# Patient Record
Sex: MALE | Race: WHITE | Employment: UNEMPLOYED | URBAN - METROPOLITAN AREA
[De-identification: names, ages, dates, MRNs, and addresses within clinical notes are randomized per-mention and may not be internally consistent; named-entity substitution may affect disease eponyms.]

---

## 2023-11-23 ENCOUNTER — HOSPITAL ENCOUNTER (EMERGENCY)
Age: 33
Discharge: ANOTHER HOSPITAL | End: 2023-11-24
Attending: STUDENT IN AN ORGANIZED HEALTH CARE EDUCATION/TRAINING PROGRAM

## 2023-11-23 DIAGNOSIS — F29 PSYCHOSIS, UNSPECIFIED PSYCHOSIS TYPE (HCC): Primary | ICD-10-CM

## 2023-11-23 LAB
ALBUMIN SERPL-MCNC: 4.9 G/DL (ref 3.4–5)
ALBUMIN/GLOB SERPL: 1.4 {RATIO} (ref 1.1–2.2)
ALP SERPL-CCNC: 90 U/L (ref 40–129)
ALT SERPL-CCNC: 18 U/L (ref 10–40)
AMPHETAMINES UR QL SCN>1000 NG/ML: ABNORMAL
ANION GAP SERPL CALCULATED.3IONS-SCNC: 18 MMOL/L (ref 3–16)
APAP SERPL-MCNC: <5 UG/ML (ref 10–30)
AST SERPL-CCNC: 17 U/L (ref 15–37)
BARBITURATES UR QL SCN>200 NG/ML: ABNORMAL
BASOPHILS # BLD: 0.1 K/UL (ref 0–0.2)
BASOPHILS NFR BLD: 0.5 %
BENZODIAZ UR QL SCN>200 NG/ML: ABNORMAL
BETA-HYDROXYBUTYRATE: 1.2 MMOL/L (ref 0–0.27)
BILIRUB SERPL-MCNC: 0.6 MG/DL (ref 0–1)
BUN SERPL-MCNC: 12 MG/DL (ref 7–20)
CALCIUM SERPL-MCNC: 9.2 MG/DL (ref 8.3–10.6)
CANNABINOIDS UR QL SCN>50 NG/ML: POSITIVE
CHLORIDE SERPL-SCNC: 104 MMOL/L (ref 99–110)
CO2 SERPL-SCNC: 19 MMOL/L (ref 21–32)
COCAINE UR QL SCN: ABNORMAL
CREAT SERPL-MCNC: 0.9 MG/DL (ref 0.9–1.3)
DEPRECATED RDW RBC AUTO: 13.5 % (ref 12.4–15.4)
DRUG SCREEN COMMENT UR-IMP: ABNORMAL
EOSINOPHIL # BLD: 0 K/UL (ref 0–0.6)
EOSINOPHIL NFR BLD: 0.1 %
ETHANOLAMINE SERPL-MCNC: NORMAL MG/DL (ref 0–0.08)
FENTANYL SCREEN, URINE: ABNORMAL
GFR SERPLBLD CREATININE-BSD FMLA CKD-EPI: >60 ML/MIN/{1.73_M2}
GLUCOSE SERPL-MCNC: 121 MG/DL (ref 70–99)
HCT VFR BLD AUTO: 44.1 % (ref 40.5–52.5)
HGB BLD-MCNC: 14.8 G/DL (ref 13.5–17.5)
LIPASE SERPL-CCNC: 156 U/L (ref 13–60)
LYMPHOCYTES # BLD: 1.5 K/UL (ref 1–5.1)
LYMPHOCYTES NFR BLD: 10.1 %
MCH RBC QN AUTO: 27.6 PG (ref 26–34)
MCHC RBC AUTO-ENTMCNC: 33.7 G/DL (ref 31–36)
MCV RBC AUTO: 82.1 FL (ref 80–100)
METHADONE UR QL SCN>300 NG/ML: ABNORMAL
MONOCYTES # BLD: 0.8 K/UL (ref 0–1.3)
MONOCYTES NFR BLD: 5.3 %
NEUTROPHILS # BLD: 12.4 K/UL (ref 1.7–7.7)
NEUTROPHILS NFR BLD: 84 %
OPIATES UR QL SCN>300 NG/ML: ABNORMAL
OXYCODONE UR QL SCN: ABNORMAL
PCP UR QL SCN>25 NG/ML: ABNORMAL
PH UR STRIP: 5 [PH]
PLATELET # BLD AUTO: 406 K/UL (ref 135–450)
PMV BLD AUTO: 8 FL (ref 5–10.5)
POTASSIUM SERPL-SCNC: 3.5 MMOL/L (ref 3.5–5.1)
PROT SERPL-MCNC: 8.4 G/DL (ref 6.4–8.2)
RBC # BLD AUTO: 5.37 M/UL (ref 4.2–5.9)
SALICYLATES SERPL-MCNC: 0.6 MG/DL (ref 15–30)
SODIUM SERPL-SCNC: 141 MMOL/L (ref 136–145)
WBC # BLD AUTO: 14.7 K/UL (ref 4–11)

## 2023-11-23 PROCEDURE — 2580000003 HC RX 258: Performed by: PHYSICIAN ASSISTANT

## 2023-11-23 PROCEDURE — 80143 DRUG ASSAY ACETAMINOPHEN: CPT

## 2023-11-23 PROCEDURE — 80179 DRUG ASSAY SALICYLATE: CPT

## 2023-11-23 PROCEDURE — 6360000002 HC RX W HCPCS: Performed by: STUDENT IN AN ORGANIZED HEALTH CARE EDUCATION/TRAINING PROGRAM

## 2023-11-23 PROCEDURE — 6360000002 HC RX W HCPCS: Performed by: PHYSICIAN ASSISTANT

## 2023-11-23 PROCEDURE — 96374 THER/PROPH/DIAG INJ IV PUSH: CPT

## 2023-11-23 PROCEDURE — 82077 ASSAY SPEC XCP UR&BREATH IA: CPT

## 2023-11-23 PROCEDURE — 96372 THER/PROPH/DIAG INJ SC/IM: CPT

## 2023-11-23 PROCEDURE — 80307 DRUG TEST PRSMV CHEM ANLYZR: CPT

## 2023-11-23 PROCEDURE — 83690 ASSAY OF LIPASE: CPT

## 2023-11-23 PROCEDURE — 85025 COMPLETE CBC W/AUTO DIFF WBC: CPT

## 2023-11-23 PROCEDURE — 82010 KETONE BODYS QUAN: CPT

## 2023-11-23 PROCEDURE — 80053 COMPREHEN METABOLIC PANEL: CPT

## 2023-11-23 PROCEDURE — 96376 TX/PRO/DX INJ SAME DRUG ADON: CPT

## 2023-11-23 PROCEDURE — 99285 EMERGENCY DEPT VISIT HI MDM: CPT

## 2023-11-23 PROCEDURE — 96375 TX/PRO/DX INJ NEW DRUG ADDON: CPT

## 2023-11-23 RX ORDER — HALOPERIDOL 5 MG/ML
5 INJECTION INTRAMUSCULAR ONCE
Status: COMPLETED | OUTPATIENT
Start: 2023-11-23 | End: 2023-11-23

## 2023-11-23 RX ORDER — 0.9 % SODIUM CHLORIDE 0.9 %
1000 INTRAVENOUS SOLUTION INTRAVENOUS ONCE
Status: COMPLETED | OUTPATIENT
Start: 2023-11-23 | End: 2023-11-23

## 2023-11-23 RX ORDER — LORAZEPAM 2 MG/ML
2 INJECTION INTRAMUSCULAR ONCE
Status: COMPLETED | OUTPATIENT
Start: 2023-11-23 | End: 2023-11-23

## 2023-11-23 RX ORDER — LIDOCAINE HYDROCHLORIDE 40 MG/ML
SOLUTION TOPICAL
Status: DISPENSED
Start: 2023-11-23 | End: 2023-11-23

## 2023-11-23 RX ORDER — DIPHENHYDRAMINE HYDROCHLORIDE 50 MG/ML
50 INJECTION INTRAMUSCULAR; INTRAVENOUS ONCE
Status: COMPLETED | OUTPATIENT
Start: 2023-11-23 | End: 2023-11-23

## 2023-11-23 RX ORDER — LORAZEPAM 1 MG/1
1 TABLET ORAL ONCE
Status: DISCONTINUED | OUTPATIENT
Start: 2023-11-23 | End: 2023-11-24 | Stop reason: HOSPADM

## 2023-11-23 RX ORDER — HYDROXYZINE PAMOATE 25 MG/1
50 CAPSULE ORAL 3 TIMES DAILY PRN
Status: DISCONTINUED | OUTPATIENT
Start: 2023-11-23 | End: 2023-11-24 | Stop reason: HOSPADM

## 2023-11-23 RX ORDER — LORAZEPAM 2 MG/ML
1 INJECTION INTRAMUSCULAR ONCE
Status: COMPLETED | OUTPATIENT
Start: 2023-11-23 | End: 2023-11-23

## 2023-11-23 RX ADMIN — LORAZEPAM 1 MG: 2 INJECTION INTRAMUSCULAR; INTRAVENOUS at 18:17

## 2023-11-23 RX ADMIN — SODIUM CHLORIDE 1000 ML: 9 INJECTION, SOLUTION INTRAVENOUS at 16:22

## 2023-11-23 RX ADMIN — SODIUM CHLORIDE 1000 ML: 9 INJECTION, SOLUTION INTRAVENOUS at 14:00

## 2023-11-23 RX ADMIN — HALOPERIDOL LACTATE 5 MG: 5 INJECTION, SOLUTION INTRAMUSCULAR at 13:54

## 2023-11-23 RX ADMIN — LORAZEPAM 2 MG: 2 INJECTION INTRAMUSCULAR; INTRAVENOUS at 13:55

## 2023-11-23 RX ADMIN — DIPHENHYDRAMINE HYDROCHLORIDE 50 MG: 50 INJECTION INTRAMUSCULAR; INTRAVENOUS at 13:54

## 2023-11-23 ASSESSMENT — SLEEP AND FATIGUE QUESTIONNAIRES
AVERAGE NUMBER OF SLEEP HOURS: 7
DO YOU HAVE DIFFICULTY SLEEPING: YES
DO YOU USE A SLEEP AID: YES
AVERAGE NUMBER OF SLEEP HOURS: 7
DO YOU HAVE DIFFICULTY SLEEPING: YES
DO YOU USE A SLEEP AID: COMMENT
SLEEP PATTERN: DIFFICULTY FALLING ASLEEP
SLEEP PATTERN: DIFFICULTY FALLING ASLEEP;RESTLESSNESS

## 2023-11-23 ASSESSMENT — PAIN - FUNCTIONAL ASSESSMENT: PAIN_FUNCTIONAL_ASSESSMENT: NONE - DENIES PAIN

## 2023-11-23 ASSESSMENT — ENCOUNTER SYMPTOMS
COUGH: 0
CHEST TIGHTNESS: 0
ABDOMINAL PAIN: 0
BACK PAIN: 1
SHORTNESS OF BREATH: 0
DIARRHEA: 0
NAUSEA: 0
VOMITING: 0

## 2023-11-23 NOTE — ED PROVIDER NOTES
In addition to the advanced practice provider, I personally saw Patito Peralta and performed a substantive portion of the visit including all aspects of the medical decision making. Medical Decision Making    Patient called police today because he was very anxious, did not know where his mother was. He tried to reach her by phone and could not so he called the police. He states that he has not slept in 3 days. He endorses racing thoughts. Pt was admitted to inpatient psych for psychosis at [address] last year  Is not taking prescribed medications from this admission  Does not shower. Does not drive. Does not work. He says that he is not suicidal or homicidal.    On exam patient is resting comfortably. He denies SI or HI. His judgment is very poor. He does appear internally stimulated. He is very disheveled and unkempt. There are cockroaches noted on his clothing. SEP-1  Is this patient to be included in the SEP-1 Core Measure due to severe sepsis or septic shock? No   Exclusion criteria - the patient is NOT to be included for SEP-1 Core Measure due to:   Infection is not suspected    Screenings     [place] Coma Scale  Eye Opening: Spontaneous  Best Verbal Response: Oriented  Best Motor Response: Obeys commands  Hubert Coma Scale Score: 15           No orders to display     Labs Reviewed   CBC WITH AUTO DIFFERENTIAL - Abnormal; Notable for the following components:       Result Value    WBC 14.7 (*)     Neutrophils Absolute 12.4 (*)     All other components within normal limits   COMPREHENSIVE METABOLIC PANEL - Abnormal; Notable for the following components:    CO2 19 (*)     Anion Gap 18 (*)     Glucose 121 (*)     Total Protein 8.4 (*)     All other components within normal limits   LIPASE - Abnormal; Notable for the following components:    Lipase 156.0 (*)     All other components within normal limits   ACETAMINOPHEN LEVEL - Abnormal; Notable for the following components:    Acetaminophen Level

## 2023-11-23 NOTE — VIRTUAL HEALTH
[facility] Crisis Assessment       Chief Complaint: \"I can't sleep\"    Diagnosis-Unspecified: Unspecified Anxiety Disorder    Voluntary/Involuntary Status: Voluntary    Guardian/POA: none    C-SSRS Risk (High, Moderate, Low): Low    Psychosis (if present): \"My game and the tv show were interacting. \"    MH & Substance Use Treatment: None reported. Substance Use: \"I use marijuana and alcohol to help me sleep sometimes. \"  I don't want to be dependent on alcohol. Trauma/Abuse: None reported    Violence: \"I would only hurt someone to defend myself. \"  \"I am a non violent person. \"    Legal: No criminal history    Access to Weapons: \"I see no reason to have weapons. I am a non violent person. \"    Risk Factors: I have not slept for 3 days. My mom went to [place] to visit family and I was worried about her. Mr. Ebonie Gutiérrez uses alcohol and marijuana to reduce anxiety and to sleep. Protective Factors: Mr. Ebonie Gutiérrez has a cell phone to be able to call his mother. His mother is supportive of him. Mr. Ebonie Gutiérrez wants to go home to take care of his dog. Support system: Mom, Anival Molina    Living Situation: I live alone with my dog. Education:    Employment: \"I am a professional . \"    Brief Summary: Mr. Ebonie Gutiérrez arrived at the ED by law enforcement. Mr. Ebonie Gutiérrez presents as agitated, unkept, and guarded. Mr. Ebonie Gutiérrez expresses that he was very anxious over his mother going to [place] to visit with family. Mr. Ebonie Gutiérrez said that he lives alone with his dog. Mr. Ebonie Gutiérrez reports that he wants to sleep but has not slept in 3 days. Mr. Ebonie Gutiérrez said that he had tried to call his mother 12 times and she would not answer the phone so he called the police due to concern for his mother. While in route to the hospital, he was able to speak to his mother and felt better knowing that she is ok. Mr. Ebonie Gutiérrez denied any suicidal thoughts and said that he has not had any SI for over a year.   Mr. Ebonie Gutiérrez denied any feelings to

## 2023-11-23 NOTE — ED NOTES
Pt yelling at staff, told this rn to \"get the hell away\". Pt screamed, and then proceeded to yell that he wants to go home.       Mitchel Valderrama RN  11/23/23 8385

## 2023-11-23 NOTE — ED NOTES
A safety risk assessment of the patient environment was conducted and the room was modified for safety. The room is free of all removed equipment, medical supplies, and articles that may pose a threat to the patient or others such as sharp items and needle boxes. Items were removed from unlocked drawers, cabinets are locked when locks are available, extra linens, medical cords, cables, pictures from wall, ect. Are all removed. The patient call light was left in place due to the medical nature of the unit ad the needs of the patient. The patient was place in a room close to the nursing desk. The patient was placed in a hospital gown. A search of the patient and patient environment was performed. All personal items including sharp objects, belts, ties, and ingestibles were removed and secured. Patient  at bedside. Bed locked and in lowest position with both side rails raised. Call light within reach. Will monitor pt. hourly.         Lashon Zhou RN  11/23/23 9087

## 2023-11-23 NOTE — ED NOTES
Pt refusing to wear monitoring equipment and refusing to give a urine sample at this time.       Itzel Echevarria RN  11/23/23 2849

## 2023-11-23 NOTE — ED NOTES
Pt agitated in bed refusing to give urine sample and still refusing to wear monitor equipment despite education     Dar Mendoza RN  11/23/23 9297

## 2023-11-23 NOTE — ED NOTES
Pt in bed resting with eyes closed sitter in place pt remains safe at this time     Farhat Pereira RN  11/23/23 0871

## 2023-11-23 NOTE — ED NOTES
Detail Level: Generalized Pt concerned for his dog, this RN reached out to [facility] for assistance. Per [facility] [place] dispatch dog has plenty of food and water and mother will take care of dog when she gets back in town tomorrow.       Matthew Paulino RN  11/23/23 3288 Include Location In Plan?: No Detail Level: Simple

## 2023-11-23 NOTE — ED NOTES
Security came and picked up patient belongings.  Shirt, pants, shoes, socks keys, phone     Ulysses Smock, RN  11/23/23 9287

## 2023-11-23 NOTE — ED PROVIDER NOTES
[facility]        Pt Name: Simon Sanchez  MRN: [id number]  [address] 1990  Date of evaluation: 11/23/2023  Provider: Enmanuel Clarke PA-C  PCP: Deborah Gilmore MD  Note Started: 10:31 AM EST 11/23/23       I have seen and evaluated this patient with my supervising physician Ant Nickersonyers, [address] [place] [address] [address]       Chief Complaint   Patient presents with    Insomnia     Patient states, I am bipolar and I have insomnia. Have not slept in 3 days. \" Lost track of days and days are blending together for years. Denies suicidal thoughts and states he has homicidal thoughts but it would only be in self defense. Feels he needs a therapist to explain what's going on in his head. Patient is disheveled and appears to not be taking care of himself such as bathing. HISTORY OF PRESENT ILLNESS: 1 or more Elements     History from : Patient    Limitations to history : None    Simon Sanchez is a 29 y.o. male with a history of bipolar, anxiety and insomnia and marijuana abuse who presents to the emergency department today stating he is experiencing insomnia and has not slept in 3 days. Patient appears very unkempt. It does not appear he has showered in quite some time. His shoes are covered in dog feces and he has an overall very foul odor. Patient states he lives in a trailer with just his dog. His mother pays for the trailer and all of his food. He does not work. He states he just plays video games all day. He smokes marijuana to help cope with his chronic back pain. He states that he was admitted to [address] [place] [address] behavioral health inpatient psychiatry last year in November for a few days. He was discharged with medications but states the medications did not help and he quit taking them. He denies having any suicidal ideation. When I asked him about homicidal ideation he states \"in video games\".   He denies headache, lightheadedness or

## 2023-11-24 VITALS
WEIGHT: 190 LBS | OXYGEN SATURATION: 100 % | RESPIRATION RATE: 16 BRPM | HEIGHT: 68 IN | SYSTOLIC BLOOD PRESSURE: 131 MMHG | HEART RATE: 85 BPM | BODY MASS INDEX: 28.79 KG/M2 | DIASTOLIC BLOOD PRESSURE: 72 MMHG | TEMPERATURE: 98 F

## 2023-11-24 PROCEDURE — 6360000002 HC RX W HCPCS: Performed by: EMERGENCY MEDICINE

## 2023-11-24 RX ORDER — HALOPERIDOL 5 MG/ML
5 INJECTION INTRAMUSCULAR ONCE
Status: DISCONTINUED | OUTPATIENT
Start: 2023-11-24 | End: 2023-11-24 | Stop reason: HOSPADM

## 2023-11-24 RX ORDER — LORAZEPAM 2 MG/ML
2 INJECTION INTRAMUSCULAR EVERY 6 HOURS PRN
Status: DISCONTINUED | OUTPATIENT
Start: 2023-11-24 | End: 2023-11-24 | Stop reason: HOSPADM

## 2023-11-24 RX ADMIN — LORAZEPAM 2 MG: 2 INJECTION, SOLUTION INTRAMUSCULAR; INTRAVENOUS at 02:54

## 2023-11-24 ASSESSMENT — PAIN SCALES - GENERAL: PAINLEVEL_OUTOF10: 0

## 2023-11-24 NOTE — ED NOTES
Pt awake, sitting in bed, VSS at this time, stating that he wants an energy drink, but informed that no drinks of that type are available. Pt provided water to drink without any issues, but Pt guarded and not very interactive at this time. RN stated to Pt that if he changes his mind about something to eat that RN would provide food if desired.      Latoya Pastrana RN  11/24/23 0095

## 2023-11-24 NOTE — ED NOTES
Report given to [facility], all questions answered during handoff. Report given to [facility] RN, all questions answered during handoff report.       Kaitlyn Miller RN  11/24/23 1653

## 2023-11-24 NOTE — ED NOTES
Pt awake and once again not wanting to comply with instructions from staff, security at bedside informing Pt to stay in his room.      Sandor Serrano RN  11/24/23 1802

## 2023-11-24 NOTE — ED NOTES
Sitter remains at bedside, room remains safe, pt resting quietly.      Vijay Burris RN  11/23/23 7864

## 2023-11-24 NOTE — ED NOTES
This RN taking over care, pt resting quietly, room remains safe, sitter at bedside.      Vijay Burris RN  11/23/23 1910

## 2023-11-24 NOTE — PROGRESS NOTES
Per RN on night shift, Pt did not have vital signs taken throughout the entire shift due to agitation and paranoia. Pt medicated by night shift RN, Pt remains on hold, at this time with room safe and currently resting in bed with eyes closed.

## 2023-12-06 ENCOUNTER — HOSPITAL ENCOUNTER (INPATIENT)
Age: 33
LOS: 5 days | Discharge: HOME | DRG: 885 | End: 2023-12-11
Attending: PSYCHIATRY & NEUROLOGY | Admitting: PSYCHIATRY & NEUROLOGY
Payer: MEDICAID

## 2023-12-06 PROBLEM — F22 PSYCHOSIS, PARANOID (HCC): Status: ACTIVE | Noted: 2023-12-06

## 2023-12-06 PROBLEM — F12.10 CANNABIS ABUSE: Status: ACTIVE | Noted: 2023-12-06

## 2023-12-06 PROBLEM — F29 PSYCHOSIS (HCC): Status: ACTIVE | Noted: 2023-12-06

## 2023-12-06 LAB — TSH SERPL DL<=0.005 MIU/L-ACNC: 1.9 UIU/ML (ref 0.27–4.2)

## 2023-12-06 PROCEDURE — 83036 HEMOGLOBIN GLYCOSYLATED A1C: CPT

## 2023-12-06 PROCEDURE — 93005 ELECTROCARDIOGRAM TRACING: CPT | Performed by: PSYCHIATRY & NEUROLOGY

## 2023-12-06 PROCEDURE — 84443 ASSAY THYROID STIM HORMONE: CPT

## 2023-12-06 PROCEDURE — 80061 LIPID PANEL: CPT

## 2023-12-06 PROCEDURE — 99223 1ST HOSP IP/OBS HIGH 75: CPT | Performed by: PSYCHIATRY & NEUROLOGY

## 2023-12-06 PROCEDURE — 36415 COLL VENOUS BLD VENIPUNCTURE: CPT

## 2023-12-06 PROCEDURE — 1240000000 HC EMOTIONAL WELLNESS R&B

## 2023-12-06 PROCEDURE — 6370000000 HC RX 637 (ALT 250 FOR IP): Performed by: PSYCHIATRY & NEUROLOGY

## 2023-12-06 RX ORDER — MAGNESIUM HYDROXIDE/ALUMINUM HYDROXICE/SIMETHICONE 120; 1200; 1200 MG/30ML; MG/30ML; MG/30ML
30 SUSPENSION ORAL EVERY 6 HOURS PRN
Status: DISCONTINUED | OUTPATIENT
Start: 2023-12-06 | End: 2023-12-11 | Stop reason: HOSPADM

## 2023-12-06 RX ORDER — BENZTROPINE MESYLATE 1 MG/ML
2 INJECTION INTRAMUSCULAR; INTRAVENOUS 2 TIMES DAILY PRN
Status: DISCONTINUED | OUTPATIENT
Start: 2023-12-06 | End: 2023-12-11 | Stop reason: HOSPADM

## 2023-12-06 RX ORDER — NICOTINE 21 MG/24HR
1 PATCH, TRANSDERMAL 24 HOURS TRANSDERMAL DAILY
Status: DISCONTINUED | OUTPATIENT
Start: 2023-12-06 | End: 2023-12-11 | Stop reason: HOSPADM

## 2023-12-06 RX ORDER — TRAZODONE HYDROCHLORIDE 50 MG/1
50 TABLET ORAL NIGHTLY PRN
Status: DISCONTINUED | OUTPATIENT
Start: 2023-12-06 | End: 2023-12-07

## 2023-12-06 RX ORDER — ACETAMINOPHEN 325 MG/1
650 TABLET ORAL EVERY 4 HOURS PRN
Status: DISCONTINUED | OUTPATIENT
Start: 2023-12-06 | End: 2023-12-11 | Stop reason: HOSPADM

## 2023-12-06 RX ORDER — POLYETHYLENE GLYCOL 3350 17 G
2 POWDER IN PACKET (EA) ORAL
Status: DISCONTINUED | OUTPATIENT
Start: 2023-12-06 | End: 2023-12-11 | Stop reason: HOSPADM

## 2023-12-06 RX ORDER — OLANZAPINE 10 MG/1
10 TABLET ORAL EVERY 4 HOURS PRN
Status: DISCONTINUED | OUTPATIENT
Start: 2023-12-06 | End: 2023-12-11 | Stop reason: HOSPADM

## 2023-12-06 RX ORDER — IBUPROFEN 400 MG/1
400 TABLET ORAL EVERY 6 HOURS PRN
Status: DISCONTINUED | OUTPATIENT
Start: 2023-12-06 | End: 2023-12-11 | Stop reason: HOSPADM

## 2023-12-06 RX ADMIN — OLANZAPINE 10 MG: 10 TABLET, FILM COATED ORAL at 17:14

## 2023-12-06 ASSESSMENT — PATIENT HEALTH QUESTIONNAIRE - PHQ9
7. TROUBLE CONCENTRATING ON THINGS, SUCH AS READING THE NEWSPAPER OR WATCHING TELEVISION: 3
8. MOVING OR SPEAKING SO SLOWLY THAT OTHER PEOPLE COULD HAVE NOTICED. OR THE OPPOSITE, BEING SO FIGETY OR RESTLESS THAT YOU HAVE BEEN MOVING AROUND A LOT MORE THAN USUAL: 0
8. MOVING OR SPEAKING SO SLOWLY THAT OTHER PEOPLE COULD HAVE NOTICED. OR THE OPPOSITE, BEING SO FIGETY OR RESTLESS THAT YOU HAVE BEEN MOVING AROUND A LOT MORE THAN USUAL: 0
6. FEELING BAD ABOUT YOURSELF - OR THAT YOU ARE A FAILURE OR HAVE LET YOURSELF OR YOUR FAMILY DOWN: 0
9. THOUGHTS THAT YOU WOULD BE BETTER OFF DEAD, OR OF HURTING YOURSELF: 0
10. IF YOU CHECKED OFF ANY PROBLEMS, HOW DIFFICULT HAVE THESE PROBLEMS MADE IT FOR YOU TO DO YOUR WORK, TAKE CARE OF THINGS AT HOME, OR GET ALONG WITH OTHER PEOPLE: 2
SUM OF ALL RESPONSES TO PHQ QUESTIONS 1-9: 14
5. POOR APPETITE OR OVEREATING: 0
SUM OF ALL RESPONSES TO PHQ9 QUESTIONS 1 & 2: 5
SUM OF ALL RESPONSES TO PHQ9 QUESTIONS 1 & 2: 6
10. IF YOU CHECKED OFF ANY PROBLEMS, HOW DIFFICULT HAVE THESE PROBLEMS MADE IT FOR YOU TO DO YOUR WORK, TAKE CARE OF THINGS AT HOME, OR GET ALONG WITH OTHER PEOPLE: 2
3. TROUBLE FALLING OR STAYING ASLEEP: 3
3. TROUBLE FALLING OR STAYING ASLEEP: 3
4. FEELING TIRED OR HAVING LITTLE ENERGY: 1
SUM OF ALL RESPONSES TO PHQ QUESTIONS 1-9: 12
SUM OF ALL RESPONSES TO PHQ QUESTIONS 1-9: 12
7. TROUBLE CONCENTRATING ON THINGS, SUCH AS READING THE NEWSPAPER OR WATCHING TELEVISION: 3
1. LITTLE INTEREST OR PLEASURE IN DOING THINGS: 3
2. FEELING DOWN, DEPRESSED OR HOPELESS: 2
SUM OF ALL RESPONSES TO PHQ QUESTIONS 1-9: 14
1. LITTLE INTEREST OR PLEASURE IN DOING THINGS: 3
6. FEELING BAD ABOUT YOURSELF - OR THAT YOU ARE A FAILURE OR HAVE LET YOURSELF OR YOUR FAMILY DOWN: 1
SUM OF ALL RESPONSES TO PHQ QUESTIONS 1-9: 14
2. FEELING DOWN, DEPRESSED OR HOPELESS: 3
SUM OF ALL RESPONSES TO PHQ QUESTIONS 1-9: 12
5. POOR APPETITE OR OVEREATING: 0
4. FEELING TIRED OR HAVING LITTLE ENERGY: 1
9. THOUGHTS THAT YOU WOULD BE BETTER OFF DEAD, OR OF HURTING YOURSELF: 0
SUM OF ALL RESPONSES TO PHQ QUESTIONS 1-9: 14
SUM OF ALL RESPONSES TO PHQ QUESTIONS 1-9: 12

## 2023-12-06 ASSESSMENT — PAIN DESCRIPTION - DESCRIPTORS: DESCRIPTORS: ACHING

## 2023-12-06 ASSESSMENT — SLEEP AND FATIGUE QUESTIONNAIRES
DO YOU HAVE DIFFICULTY SLEEPING: YES
SLEEP PATTERN: DIFFICULTY FALLING ASLEEP;RESTLESSNESS
DO YOU HAVE DIFFICULTY SLEEPING: YES
AVERAGE NUMBER OF SLEEP HOURS: 2
SLEEP PATTERN: DIFFICULTY FALLING ASLEEP;RESTLESSNESS;DISTURBED/INTERRUPTED SLEEP
AVERAGE NUMBER OF SLEEP HOURS: 2
DO YOU USE A SLEEP AID: YES
DO YOU USE A SLEEP AID: YES

## 2023-12-06 ASSESSMENT — PAIN DESCRIPTION - FREQUENCY: FREQUENCY: CONTINUOUS

## 2023-12-06 ASSESSMENT — PAIN DESCRIPTION - PAIN TYPE: TYPE: CHRONIC PAIN

## 2023-12-06 ASSESSMENT — PAIN DESCRIPTION - LOCATION
LOCATION: FOOT
LOCATION: FOOT

## 2023-12-06 ASSESSMENT — PAIN DESCRIPTION - ORIENTATION
ORIENTATION: RIGHT;LEFT
ORIENTATION: RIGHT;LEFT

## 2023-12-06 ASSESSMENT — LIFESTYLE VARIABLES
HOW MANY STANDARD DRINKS CONTAINING ALCOHOL DO YOU HAVE ON A TYPICAL DAY: 5 OR 6
HOW OFTEN DO YOU HAVE A DRINK CONTAINING ALCOHOL: MONTHLY OR LESS

## 2023-12-06 ASSESSMENT — PAIN SCALES - GENERAL: PAINLEVEL_OUTOF10: 8

## 2023-12-06 NOTE — PLAN OF CARE
Problem: Pain  Goal: Verbalizes/displays adequate comfort level or baseline comfort level  Outcome: Not Progressing     Problem: Irma  Goal: Will exhibit normal sleep and speech and no impulsivity  Description: INTERVENTIONS:  1. Administer medication as ordered  2. Set limits on impulsive behavior  3. Make attempts to decrease external stimuli as possible  Outcome: Not Progressing     Problem: Psychosis  Goal: Will report no hallucinations or delusions  Description: INTERVENTIONS:  1. Administer medication as  ordered  2. Assist with reality testing to support increasing orientation  3. Assess if patient's hallucinations or delusions are encouraging self harm or harm to others and intervene as appropriate  Outcome: Not Progressing     Problem: Behavior  Goal: Pt/Family maintain appropriate behavior and adhere to behavioral management agreement, if implemented  Description: INTERVENTIONS:  1. Assess patient/family's coping skills and  non-compliant behavior (including use of illegal substances)  2. Notify security of behavior or suspected illegal substances which indicate the need for search of the family and/or belongings  3. Encourage verbalization of thoughts and concerns in a socially appropriate manner  4. Utilize positive, consistent limit setting strategies supporting safety of patient, staff and others  5. Encourage participation in the decision making process about the behavioral management agreement  6. If a visitor's behavior poses a threat to safety call refer to organization policy. 7. Initiate consult with , Psychosocial CNS, Spiritual Care as appropriate  Outcome: Not Progressing     Problem: Anxiety  Goal: Will report anxiety at manageable levels  Description: INTERVENTIONS:  1. Administer medication as ordered  2. Teach and rehearse alternative coping skills  3.  Provide emotional support with 1:1 interaction with staff  Outcome: Not Progressing     Problem: Sleep Disturbance  Goal:

## 2023-12-06 NOTE — CARE COORDINATION
SW met w/Pt 1:1 to complete their psychosocial assessment and lifetime CSSR-S. The Pt was preoccupied an friendly. Pt reported being admitted because their PS4 and cell phone are sending them messages that are very upsetting. The Pt reports feeling upset to the point that he wants to get a shotgun and shoot his PS4. The Pt reports feeling depressed because of the messages his PS4 is sending and not being able to play it like normal. The Pt denies any SI or Hx of SI.     12/06/23 1402   Psychiatric History   Psychiatric history treatment Psychiatric admissions  (Pt reports previous admissions. Pt beleives he has had 2 previous admissions before but is unsure)   Are there any medication issues? No   Recent Psychological Experiences Turmoil (comment)  (Pt reports being admitted due to becoming upset when his PS4 began malfunctioning. Pt reports that when his PS4 gets messed up it shows him constant subliminal sexual messages that make him extremely upset.)   Support System   Support system Adequate   Types of Support System Mother   Problems in support system Lack of friends/family; Lack of access/ transportation   Current Living Situation   Home Living Adequate   Living information Lives with others  (Pt lives in a trailer w/his mother)   Problems with living situation  No   Lack of basic needs No   SSDI/SSI Denies   Other government assistance Denies   Current abuse issues Denies   Supervised setting None   Relationship problems No   Medical and Self-Care Issues   Relevant medical problems Denies   Relevant self-care issues Denies   Barriers to treatment Yes  (Finances, motivation, transportation)   Family Constellation   Spouse/partner-name/age N/A   Children-names/ages N/A   Parents Mtz Ice   Siblings N/A   Support services   (Denies)   Childhood   Raised by Biological mother   Biological mother Venancio Henley family history Bi-Polar, father commited suicide   History of abuse No   Legal History

## 2023-12-07 PROBLEM — R82.81 PYURIA: Status: ACTIVE | Noted: 2023-12-07

## 2023-12-07 PROBLEM — R74.8 ELEVATED LIPASE: Status: ACTIVE | Noted: 2023-12-07

## 2023-12-07 LAB
ALBUMIN SERPL-MCNC: 4.5 G/DL (ref 3.4–5)
ALP SERPL-CCNC: 94 U/L (ref 40–129)
ALT SERPL-CCNC: 19 U/L (ref 10–40)
ANION GAP SERPL CALCULATED.3IONS-SCNC: 13 MMOL/L (ref 3–16)
AST SERPL-CCNC: 12 U/L (ref 15–37)
BACTERIA URNS QL MICRO: ABNORMAL /HPF
BASOPHILS # BLD: 0.1 K/UL (ref 0–0.2)
BASOPHILS NFR BLD: 0.4 %
BILIRUB DIRECT SERPL-MCNC: <0.2 MG/DL (ref 0–0.3)
BILIRUB INDIRECT SERPL-MCNC: ABNORMAL MG/DL (ref 0–1)
BILIRUB SERPL-MCNC: 0.4 MG/DL (ref 0–1)
BILIRUB UR QL STRIP.AUTO: NEGATIVE
BUN SERPL-MCNC: 9 MG/DL (ref 7–20)
CALCIUM SERPL-MCNC: 9.4 MG/DL (ref 8.3–10.6)
CHLORIDE SERPL-SCNC: 102 MMOL/L (ref 99–110)
CHOLEST SERPL-MCNC: 147 MG/DL (ref 0–199)
CLARITY UR: CLEAR
CO2 SERPL-SCNC: 24 MMOL/L (ref 21–32)
COLOR UR: YELLOW
CREAT SERPL-MCNC: 0.9 MG/DL (ref 0.9–1.3)
DEPRECATED RDW RBC AUTO: 13.5 % (ref 12.4–15.4)
EKG ATRIAL RATE: 65 BPM
EKG DIAGNOSIS: NORMAL
EKG P AXIS: 48 DEGREES
EKG P-R INTERVAL: 142 MS
EKG Q-T INTERVAL: 400 MS
EKG QRS DURATION: 88 MS
EKG QTC CALCULATION (BAZETT): 416 MS
EKG R AXIS: 59 DEGREES
EKG T AXIS: 66 DEGREES
EKG VENTRICULAR RATE: 65 BPM
EOSINOPHIL # BLD: 0.1 K/UL (ref 0–0.6)
EOSINOPHIL NFR BLD: 1 %
EPI CELLS #/AREA URNS HPF: ABNORMAL /HPF (ref 0–5)
GFR SERPLBLD CREATININE-BSD FMLA CKD-EPI: >60 ML/MIN/{1.73_M2}
GLUCOSE SERPL-MCNC: 100 MG/DL (ref 70–99)
GLUCOSE UR STRIP.AUTO-MCNC: NEGATIVE MG/DL
HCT VFR BLD AUTO: 43.6 % (ref 40.5–52.5)
HDLC SERPL-MCNC: 44 MG/DL (ref 40–60)
HGB BLD-MCNC: 14.6 G/DL (ref 13.5–17.5)
HGB UR QL STRIP.AUTO: NEGATIVE
KETONES UR STRIP.AUTO-MCNC: NEGATIVE MG/DL
LDLC SERPL CALC-MCNC: 85 MG/DL
LEUKOCYTE ESTERASE UR QL STRIP.AUTO: ABNORMAL
LIPASE SERPL-CCNC: 584 U/L (ref 13–60)
LYMPHOCYTES # BLD: 3.2 K/UL (ref 1–5.1)
LYMPHOCYTES NFR BLD: 23.8 %
MCH RBC QN AUTO: 27.7 PG (ref 26–34)
MCHC RBC AUTO-ENTMCNC: 33.6 G/DL (ref 31–36)
MCV RBC AUTO: 82.6 FL (ref 80–100)
MONOCYTES # BLD: 1.1 K/UL (ref 0–1.3)
MONOCYTES NFR BLD: 8.6 %
MUCOUS THREADS #/AREA URNS LPF: ABNORMAL /LPF
NEUTROPHILS # BLD: 8.8 K/UL (ref 1.7–7.7)
NEUTROPHILS NFR BLD: 66.2 %
NITRITE UR QL STRIP.AUTO: NEGATIVE
PH UR STRIP.AUTO: 6.5 [PH] (ref 5–8)
PLATELET # BLD AUTO: 416 K/UL (ref 135–450)
PMV BLD AUTO: 8.3 FL (ref 5–10.5)
POTASSIUM SERPL-SCNC: 3.6 MMOL/L (ref 3.5–5.1)
PROT SERPL-MCNC: 8 G/DL (ref 6.4–8.2)
PROT UR STRIP.AUTO-MCNC: NEGATIVE MG/DL
RBC # BLD AUTO: 5.28 M/UL (ref 4.2–5.9)
RBC #/AREA URNS HPF: ABNORMAL /HPF (ref 0–4)
SODIUM SERPL-SCNC: 139 MMOL/L (ref 136–145)
SP GR UR STRIP.AUTO: <=1.005 (ref 1–1.03)
TRIGL SERPL-MCNC: 88 MG/DL (ref 0–150)
UA COMPLETE W REFLEX CULTURE PNL UR: ABNORMAL
UA DIPSTICK W REFLEX MICRO PNL UR: YES
URN SPEC COLLECT METH UR: ABNORMAL
UROBILINOGEN UR STRIP-ACNC: 0.2 E.U./DL
VLDLC SERPL CALC-MCNC: 18 MG/DL
WBC # BLD AUTO: 13.3 K/UL (ref 4–11)
WBC #/AREA URNS HPF: ABNORMAL /HPF (ref 0–5)

## 2023-12-07 PROCEDURE — 1240000000 HC EMOTIONAL WELLNESS R&B

## 2023-12-07 PROCEDURE — 99233 SBSQ HOSP IP/OBS HIGH 50: CPT | Performed by: PSYCHIATRY & NEUROLOGY

## 2023-12-07 PROCEDURE — 99221 1ST HOSP IP/OBS SF/LOW 40: CPT

## 2023-12-07 PROCEDURE — 36415 COLL VENOUS BLD VENIPUNCTURE: CPT

## 2023-12-07 PROCEDURE — 80076 HEPATIC FUNCTION PANEL: CPT

## 2023-12-07 PROCEDURE — 85025 COMPLETE CBC W/AUTO DIFF WBC: CPT

## 2023-12-07 PROCEDURE — 6370000000 HC RX 637 (ALT 250 FOR IP): Performed by: PSYCHIATRY & NEUROLOGY

## 2023-12-07 PROCEDURE — 83690 ASSAY OF LIPASE: CPT

## 2023-12-07 PROCEDURE — 93010 ELECTROCARDIOGRAM REPORT: CPT | Performed by: INTERNAL MEDICINE

## 2023-12-07 PROCEDURE — 80048 BASIC METABOLIC PNL TOTAL CA: CPT

## 2023-12-07 PROCEDURE — 81001 URINALYSIS AUTO W/SCOPE: CPT

## 2023-12-07 RX ORDER — OLANZAPINE 5 MG/1
5 TABLET ORAL 2 TIMES DAILY
Status: DISCONTINUED | OUTPATIENT
Start: 2023-12-07 | End: 2023-12-10

## 2023-12-07 RX ORDER — HYDROXYZINE 50 MG/1
50 TABLET, FILM COATED ORAL EVERY 6 HOURS PRN
Status: DISCONTINUED | OUTPATIENT
Start: 2023-12-07 | End: 2023-12-11 | Stop reason: HOSPADM

## 2023-12-07 RX ORDER — TRAZODONE HYDROCHLORIDE 50 MG/1
50 TABLET ORAL NIGHTLY
Status: DISCONTINUED | OUTPATIENT
Start: 2023-12-07 | End: 2023-12-08

## 2023-12-07 RX ADMIN — OLANZAPINE 5 MG: 5 TABLET, FILM COATED ORAL at 10:55

## 2023-12-07 RX ADMIN — NICOTINE POLACRILEX 2 MG: 2 LOZENGE ORAL at 20:12

## 2023-12-07 RX ADMIN — OLANZAPINE 5 MG: 5 TABLET, FILM COATED ORAL at 20:12

## 2023-12-07 RX ADMIN — TRAZODONE HYDROCHLORIDE 50 MG: 50 TABLET ORAL at 20:12

## 2023-12-07 ASSESSMENT — PAIN SCALES - GENERAL: PAINLEVEL_OUTOF10: 0

## 2023-12-07 NOTE — H&P
Hospital Medicine History & Physical      PCP: Anitha Jennings MD    Date of Admission: 12/6/2023    Date of Service: Pt seen/examined on 12/07/23     Chief Complaint:  No chief complaint on file. History Of Present Illness: The patient is a 29 y.o. male with pmhx as below who presented to [facility] for paranoia, auditory and visual hallucinations. Patient was seen and evaluated in the ED by the ED medical provider, patient was medically cleared for admission to [facility]. This note serves as an admission medical H&P. Tobacco use: 1 pdd and vapes  ETOH use: only on birthday   Illicit drug use: cannabis     Patient complains of right hand bruising/pain from lab work and bilateral feet pain due to flat feet. Past Medical History:        Diagnosis Date    Anxiety     Chronic back pain        Past Surgical History:    No past surgical history on file. Medications Prior to Admission:    Prior to Admission medications    Medication Sig Start Date End Date Taking? Authorizing Provider   ibuprofen (ADVIL;MOTRIN) 800 MG tablet Take 1 tablet by mouth every 6 hours as needed for Pain. 4/17/15   Kerri Evans PA-C       Allergies:  Grass pollen(k-o-r-t-swt ricardo) and Pcn [penicillins]    Social History:  The patient currently lives home with mother. TOBACCO:   reports that he has been smoking cigarettes. He has been smoking an average of 1 pack per day. He has never used smokeless tobacco.  ETOH:   reports current alcohol use. Family History:   Positive as follows:    No family history on file.     REVIEW OF SYSTEMS:       Constitutional: Negative for fever   HENT: Negative for sore throat   Eyes: Negative for redness   Respiratory: Negative  for dyspnea, cough   Cardiovascular: Negative for chest pain   Gastrointestinal: Negative for vomiting, diarrhea   Genitourinary: Negative for hematuria   Musculoskeletal: + right hand pain, + bilateral feet pain   Skin: Negative for rash   +
12/06/2023    And Present on Admission:   Psychosis, paranoid ([address])   Cannabis abuse    Axis 4: Other psychosocial and environmental problems    Axis 5: 41-50 serious symptoms   All conditions on Axis 1 and Axis 2 and active Axis 3 problems are being treated while patient is hospitalized. Active Hospital Problems    Diagnosis Date Noted    Psychosis, paranoid ([address]) [F22] 12/06/2023    Cannabis abuse [F12.10] 12/06/2023     Tx plan:  prevent self injury, stabilize affect, restore sleep, treat depression, treat anxiety, establish/maintain aftercare, increase coping mechanisms, improve medication compliance. All conditions present on admission are being treated while pt is hospitalized. Discussed PHP after discharge as part of transition back to the community.      Medications  Current Facility-Administered Medications   Medication Dose Route Frequency Provider Last Rate Last Admin    acetaminophen (TYLENOL) tablet 650 mg  650 mg Oral Q4H PRN Brook Meckel, MD        ibuprofen (ADVIL;MOTRIN) tablet 400 mg  400 mg Oral Q6H PRN Yarelis Montes MD        magnesium hydroxide (MILK OF MAGNESIA) 400 MG/5ML suspension 30 mL  30 mL Oral Daily PRN Yarelis Montes MD        nicotine (NICODERM CQ) 21 MG/24HR 1 patch  1 patch TransDERmal Daily Yarelis Doty MD        nicotine polacrilex (COMMIT) lozenge 2 mg  2 mg Oral Q1H PRN Brook Meckel, MD        aluminum & magnesium hydroxide-simethicone (MAALOX) 495-171-66 MG/5ML suspension 30 mL  30 mL Oral Q6H PRN Brook Meckel, MD        OLANZapine (ZYPREXA) tablet 10 mg  10 mg Oral Q4H PRN Brook Meckel, MD        Or    OLANZapine (ZyPREXA) 10 mg in sterile water 2 mL injection  10 mg IntraMUSCular Q4H PRN Brook Meckel, MD        benztropine mesylate (COGENTIN) injection 2 mg  2 mg IntraMUSCular BID PRN Brook Meckel, MD        traZODone (DESYREL) tablet 50 mg  50 mg Oral Nightly PRN Brook Meckel, MD          nicotine  1 patch

## 2023-12-07 NOTE — DISCHARGE INSTRUCTIONS
Advanced Directives:  Patient does not have a surrogate decision maker appointed   Name (if yes): N/A Phone Number: N/A  Patient does not have a psychiatric and/ or medical advanced directive or power of . Patient was offered psychiatric and/ or medical advanced directive or power of  information/completion but declined to complete   Why not? N/A    Discharge Planning is Complete. Discharge Date: 12/11/23   Reason for Hospitalization: Patient is a 29 y.o. male who presents  for psychosis. He reports his PlayStation 4 is \"possessed and sex hungry. \" He continues to describe the PlayStation is responsible for sending him subliminal messages all relating to sexual content. Patient asked to elaborate and he says it Deborah porn out of regular movies. \" He said it has taken over his life and now is everywhere, not just on his PlayStation. He sees these subliminal messages on every electronic, business, and words. He stood up to show me and example. The wall reads \"Behavioral Health\" and he was able to read this accurately. However, he pointed in between letters like \"B,\" \"a,\" and \"o\" saying they look like \"sexual holes. \" He said this has been going on for 2 years now. It began 2 years ago on Thanksgiving and his symptoms have progressively worsened since. Denies auditory hallucinations, suicidal or homicidal ideation. Discharge Diagnosis: Psychosis, paranoid ([address])   Discharging to: Home    Your instructions: Your physician here was Pablo Payton MD. If you have any questions please call the unit at [phone] for the adult unit or [phone] for [facility]. Please note that we have a patient family advisory [place] that meets the second Wednesday of January and the second Wednesday of July at [address] [place], [place] [address] in [place] at [facility]. Department leadership would love for you to attend to give feedback on what we are doing well and areas in which we can improve our patient care.

## 2023-12-07 NOTE — PLAN OF CARE
Problem: Pain  Goal: Verbalizes/displays adequate comfort level or baseline comfort level  12/6/2023 2152 by Soledad Lopez RN  Outcome: Progressing  12/6/2023 1356 by Jaime Sterling RN  Outcome: Not Progressing     Problem: Irma  Goal: Will exhibit normal sleep and speech and no impulsivity  Description: INTERVENTIONS:  1. Administer medication as ordered  2. Set limits on impulsive behavior  3. Make attempts to decrease external stimuli as possible  12/6/2023 2152 by Soledad Lopez RN  Outcome: Progressing  12/6/2023 1356 by Jaime Sterling RN  Outcome: Not Progressing     Problem: Psychosis  Goal: Will report no hallucinations or delusions  Description: INTERVENTIONS:  1. Administer medication as  ordered  2. Assist with reality testing to support increasing orientation  3. Assess if patient's hallucinations or delusions are encouraging self harm or harm to others and intervene as appropriate  12/6/2023 2152 by Soledad Lopez RN  Outcome: Progressing  12/6/2023 1356 by Jaime Sterling RN  Outcome: Not Progressing     Problem: Behavior  Goal: Pt/Family maintain appropriate behavior and adhere to behavioral management agreement, if implemented  Description: INTERVENTIONS:  1. Assess patient/family's coping skills and  non-compliant behavior (including use of illegal substances)  2. Notify security of behavior or suspected illegal substances which indicate the need for search of the family and/or belongings  3. Encourage verbalization of thoughts and concerns in a socially appropriate manner  4. Utilize positive, consistent limit setting strategies supporting safety of patient, staff and others  5. Encourage participation in the decision making process about the behavioral management agreement  6. If a visitor's behavior poses a threat to safety call refer to organization policy.   7. Initiate consult with , Psychosocial CNS, Spiritual Care as appropriate  12/6/2023 2152 by Jennifer Higgins

## 2023-12-07 NOTE — PLAN OF CARE
+meds. +group. Stood by doorway but gave attention to group in dayroom. N.O. Lipase 1x and Hepatic Funtion Panel 1x + urinalysis. Calm, friendly and cooperative. Withdrawn to room. Not understanding why he's taking Zyprexa, educated pt on benefits of medication. Educated pt he will see psych dr today      Problem: Pain  Goal: Verbalizes/displays adequate comfort level or baseline comfort level  12/7/2023 1006 by Chantal Alexandra RN  Outcome: Progressing  12/6/2023 2152 by Malia Oates RN  Outcome: Progressing     Problem: Irma  Goal: Will exhibit normal sleep and speech and no impulsivity  Description: INTERVENTIONS:  1. Administer medication as ordered  2. Set limits on impulsive behavior  3. Make attempts to decrease external stimuli as possible  12/7/2023 1006 by Chantal Alexandra RN  Outcome: Progressing  12/6/2023 2152 by Malia Oates RN  Outcome: Progressing     Problem: Psychosis  Goal: Will report no hallucinations or delusions  Description: INTERVENTIONS:  1. Administer medication as  ordered  2. Assist with reality testing to support increasing orientation  3. Assess if patient's hallucinations or delusions are encouraging self harm or harm to others and intervene as appropriate  12/6/2023 2152 by Malia Oates RN  Outcome: Progressing     Problem: Behavior  Goal: Pt/Family maintain appropriate behavior and adhere to behavioral management agreement, if implemented  Description: INTERVENTIONS:  1. Assess patient/family's coping skills and  non-compliant behavior (including use of illegal substances)  2. Notify security of behavior or suspected illegal substances which indicate the need for search of the family and/or belongings  3. Encourage verbalization of thoughts and concerns in a socially appropriate manner  4. Utilize positive, consistent limit setting strategies supporting safety of patient, staff and others  5.  Encourage participation in the decision making process about the

## 2023-12-08 PROBLEM — F29 PSYCHOSIS, UNSPECIFIED PSYCHOSIS TYPE (HCC): Status: ACTIVE | Noted: 2023-12-08

## 2023-12-08 LAB
EST. AVERAGE GLUCOSE BLD GHB EST-MCNC: 96.8 MG/DL
HBA1C MFR BLD: 5 %

## 2023-12-08 PROCEDURE — 99239 HOSP IP/OBS DSCHRG MGMT >30: CPT | Performed by: PSYCHIATRY & NEUROLOGY

## 2023-12-08 PROCEDURE — 6370000000 HC RX 637 (ALT 250 FOR IP): Performed by: PSYCHIATRY & NEUROLOGY

## 2023-12-08 PROCEDURE — 1240000000 HC EMOTIONAL WELLNESS R&B

## 2023-12-08 RX ORDER — MIRTAZAPINE 15 MG/1
15 TABLET, FILM COATED ORAL NIGHTLY
Status: DISCONTINUED | OUTPATIENT
Start: 2023-12-08 | End: 2023-12-11 | Stop reason: HOSPADM

## 2023-12-08 RX ADMIN — OLANZAPINE 5 MG: 5 TABLET, FILM COATED ORAL at 20:00

## 2023-12-08 RX ADMIN — OLANZAPINE 5 MG: 5 TABLET, FILM COATED ORAL at 07:54

## 2023-12-08 RX ADMIN — MIRTAZAPINE 15 MG: 15 TABLET, FILM COATED ORAL at 20:00

## 2023-12-08 RX ADMIN — MAGNESIUM HYDROXIDE 30 ML: 1200 LIQUID ORAL at 14:05

## 2023-12-08 NOTE — GROUP NOTE
Group Therapy Note    Date: 2023    Group Start Time:   Group End Time:   Group Topic: [place], RN    Wrap up/goals group.     Group Therapy Note    Attendees:          Patient's Goal:  ***    Notes:  ***    Status After Intervention:  {Status After Intervention:251578583}    Participation Level: {Participation Level:505918391}    Participation Quality: {[facility] PARTICIPATION QUALITY:761750429}      Speech:  {[facility] CD_SPEECH:65159}      Thought Process/Content: {Thought Process/Content:416201990}      Affective Functioning: {Affective Functionin}      Mood: {Mood:764027128}      Level of consciousness:  {Level of consciousness:449015133}      Response to Learnin [facility] Responses to Learnin}      Endings: {[facility] Endings:29602}    Modes of Intervention: {[facility] Modes of Intervention:145730070}      Discipline Responsible: [address] [facility] Multidisciplinary:[id number]}      Signature:  Adiel Estrada RN

## 2023-12-08 NOTE — PLAN OF CARE
Patient denies SI/HI/AH/VH, currently. He participated during group this evening. Reports he feels less anxious than earlier this day. No signs of distress noted, currently. Problem: Behavior  Goal: Pt/Family maintain appropriate behavior and adhere to behavioral management agreement, if implemented  Description: INTERVENTIONS:  1. Assess patient/family's coping skills and  non-compliant behavior (including use of illegal substances)  2. Notify security of behavior or suspected illegal substances which indicate the need for search of the family and/or belongings  3. Encourage verbalization of thoughts and concerns in a socially appropriate manner  4. Utilize positive, consistent limit setting strategies supporting safety of patient, staff and others  5. Encourage participation in the decision making process about the behavioral management agreement  6. If a visitor's behavior poses a threat to safety call refer to organization policy. 7. Initiate consult with , Psychosocial CNS, Spiritual Care as appropriate  12/7/2023 2143 by Angel Luis Connolly RN  Outcome: Progressing  12/7/2023 1030 by Humera Bolton RN  Outcome: Progressing  12/7/2023 1006 by Humera Bolton RN  Outcome: Progressing     Problem: Psychosis  Goal: Will report no hallucinations or delusions  Description: INTERVENTIONS:  1. Administer medication as  ordered  2. Assist with reality testing to support increasing orientation  3. Assess if patient's hallucinations or delusions are encouraging self harm or harm to others and intervene as appropriate  Outcome: Progressing     Problem: Psychosis  Goal: Will report no hallucinations or delusions  Description: INTERVENTIONS:  1. Administer medication as  ordered  2. Assist with reality testing to support increasing orientation  3.  Assess if patient's hallucinations or delusions are encouraging self harm or harm to others and intervene as appropriate  Outcome: Progressing

## 2023-12-09 PROCEDURE — 1240000000 HC EMOTIONAL WELLNESS R&B

## 2023-12-09 PROCEDURE — 6370000000 HC RX 637 (ALT 250 FOR IP): Performed by: PSYCHIATRY & NEUROLOGY

## 2023-12-09 RX ADMIN — MIRTAZAPINE 15 MG: 15 TABLET, FILM COATED ORAL at 20:52

## 2023-12-09 RX ADMIN — OLANZAPINE 5 MG: 5 TABLET, FILM COATED ORAL at 20:52

## 2023-12-09 RX ADMIN — OLANZAPINE 5 MG: 5 TABLET, FILM COATED ORAL at 08:55

## 2023-12-09 RX ADMIN — ALUMINUM HYDROXIDE, MAGNESIUM HYDROXIDE, AND SIMETHICONE 30 ML: 200; 200; 20 SUSPENSION ORAL at 19:48

## 2023-12-09 ASSESSMENT — PAIN DESCRIPTION - LOCATION: LOCATION: ABDOMEN

## 2023-12-09 ASSESSMENT — PAIN SCALES - GENERAL: PAINLEVEL_OUTOF10: 6

## 2023-12-09 NOTE — PLAN OF CARE
Patient has been isolative to his room this evening. He is compliant with medications. Denies SI/HI/AH/VH, currently. Reports decreased anxiety 3/10. No signs of distress noted, currently. Problem: Anxiety  Goal: Will report anxiety at manageable levels  Description: INTERVENTIONS:  1. Administer medication as ordered  2. Teach and rehearse alternative coping skills  3.  Provide emotional support with 1:1 interaction with staff  Outcome: Progressing

## 2023-12-10 PROCEDURE — 6370000000 HC RX 637 (ALT 250 FOR IP): Performed by: NURSE PRACTITIONER

## 2023-12-10 PROCEDURE — 1240000000 HC EMOTIONAL WELLNESS R&B

## 2023-12-10 PROCEDURE — 6370000000 HC RX 637 (ALT 250 FOR IP): Performed by: PSYCHIATRY & NEUROLOGY

## 2023-12-10 PROCEDURE — 99232 SBSQ HOSP IP/OBS MODERATE 35: CPT | Performed by: NURSE PRACTITIONER

## 2023-12-10 RX ORDER — OLANZAPINE 5 MG/1
7.5 TABLET ORAL 2 TIMES DAILY
Status: DISCONTINUED | OUTPATIENT
Start: 2023-12-10 | End: 2023-12-11 | Stop reason: HOSPADM

## 2023-12-10 RX ADMIN — OLANZAPINE 5 MG: 5 TABLET, FILM COATED ORAL at 07:59

## 2023-12-10 RX ADMIN — ALUMINUM HYDROXIDE, MAGNESIUM HYDROXIDE, AND SIMETHICONE 30 ML: 200; 200; 20 SUSPENSION ORAL at 21:15

## 2023-12-10 RX ADMIN — IBUPROFEN 400 MG: 400 TABLET, FILM COATED ORAL at 18:04

## 2023-12-10 RX ADMIN — OLANZAPINE 7.5 MG: 5 TABLET, FILM COATED ORAL at 21:12

## 2023-12-10 RX ADMIN — MIRTAZAPINE 15 MG: 15 TABLET, FILM COATED ORAL at 21:11

## 2023-12-10 ASSESSMENT — PAIN DESCRIPTION - LOCATION
LOCATION: BACK
LOCATION: OTHER (COMMENT)

## 2023-12-10 ASSESSMENT — PAIN SCALES - GENERAL
PAINLEVEL_OUTOF10: 4
PAINLEVEL_OUTOF10: 6

## 2023-12-10 ASSESSMENT — PAIN - FUNCTIONAL ASSESSMENT: PAIN_FUNCTIONAL_ASSESSMENT: ACTIVITIES ARE NOT PREVENTED

## 2023-12-10 ASSESSMENT — PAIN DESCRIPTION - DESCRIPTORS
DESCRIPTORS: BURNING
DESCRIPTORS: ACHING;CRAMPING;DISCOMFORT

## 2023-12-10 NOTE — PLAN OF CARE
Problem: Pain  Goal: Verbalizes/displays adequate comfort level or baseline comfort level  Outcome: Progressing     Problem: Irma  Goal: Will exhibit normal sleep and speech and no impulsivity  Description: INTERVENTIONS:  1. Administer medication as ordered  2. Set limits on impulsive behavior  3. Make attempts to decrease external stimuli as possible  Outcome: Progressing     Problem: Psychosis  Goal: Will report no hallucinations or delusions  Description: INTERVENTIONS:  1. Administer medication as  ordered  2. Assist with reality testing to support increasing orientation  3. Assess if patient's hallucinations or delusions are encouraging self harm or harm to others and intervene as appropriate  Outcome: Progressing     Problem: Behavior  Goal: Pt/Family maintain appropriate behavior and adhere to behavioral management agreement, if implemented  Description: INTERVENTIONS:  1. Assess patient/family's coping skills and  non-compliant behavior (including use of illegal substances)  2. Notify security of behavior or suspected illegal substances which indicate the need for search of the family and/or belongings  3. Encourage verbalization of thoughts and concerns in a socially appropriate manner  4. Utilize positive, consistent limit setting strategies supporting safety of patient, staff and others  5. Encourage participation in the decision making process about the behavioral management agreement  6. If a visitor's behavior poses a threat to safety call refer to organization policy. 7. Initiate consult with , Psychosocial CNS, Spiritual Care as appropriate  Outcome: Progressing     Problem: Anxiety  Goal: Will report anxiety at manageable levels  Description: INTERVENTIONS:  1. Administer medication as ordered  2. Teach and rehearse alternative coping skills  3.  Provide emotional support with 1:1 interaction with staff  Outcome: Progressing

## 2023-12-10 NOTE — PROGRESS NOTES
1804, complained of back pain, scored it 6/10, Ibuprofen 400mg given as PRN
Behavioral Services  Medicare Certification Upon Admission    I certify that this patient's inpatient psychiatric hospital admission is medically necessary for:    [x] (1) Treatment which could reasonably be expected to improve this patient's condition,       [x] (2) Or for diagnostic study;     AND     [x](2) The inpatient psychiatric services are provided while the individual is under the care of a physician and are included in the individualized plan of care.     Estimated length of stay/service 5 d    Plan for post-hospital care outpt    Electronically signed by Kellen Gar MD on 12/6/2023 at 2:46 PM
Home Medication Reconciliation Status          [x] COMPLETE       Medication history has been reviewed and obtained from the following source(s):       [x] patient/family verbal report             [] patient/family provided written list       [] external pharmacy   [] external facility list         []  Provider notified that home medication reconciliation is complete          [] IN PROGRESS       Medication reconciliation marked in progress at this time due to:       [] patient/family poor historian      [] waiting arrival of family to clarify       [] waiting for accurate list        [] external pharmacy needs called      * Follow up is needed. [] UNABLE TO ASSESS       Medication reconciliation is incomplete and unable to assess at this time due to:       [] critical patient condition   [] patient is unresponsive        [] no family available                       [] unknown pharmacy       [] anonymous patient          * Follow up is needed.       [] Pharmacy consult placed for medication reconciliation assistance   Additional comments:
Patient has been observed in day room withdrawn to self. Patient interacts well with staff. He is cooperative with assessment. Patient denies any AVH. No SI/HI. He reports mild anxiety due to not liking being in a hospital. He reports good appetite. He is with good eye contact. He is hopeful for discharge on Monday. No distress noted. Continuing to monitor.
Per Dr. Mamadou amado for pt to have shoes.
Pt arrived to unit via stretcher. VS WNL. Pt changed into safety gown. Admission paperwork reviewed with pt. Pt signed some paperwork but became agitated stating he was starving and needed to eat and he was being denied food. Pt given pop tart and drink and lunch tray ordered. Pt denied current SI. Pt states he is current does not have a pharmacy and only takes some OTC medication like IBU when needed. Nurse will re-attempt paperwork and admission assessment after pt eats.
Pt c/o of constipation. PRN Milk of Mag given at 1405.
Pt c/o pain in feet. Pt states it is due to being flat footed. Pt denied need for PRN pain medication such as tylenol. Pt states the only thing that will help is having his shoes. Policy reviewed with pt. Dr. Conde Sensing made aware.
Pt offered flu vaccine on admission and pt refused.
[facility]  Admission Note     Admission Type:   Admission Type: Involuntary (Pt did sign voluntary admission paperwork)    Reason for admission:  Reason for Admission: Pt states he is paranoid thinking something will happen to his mom. Pt also states he is recieving messages from his Ps4. PATIENT STRENGTHS:       Patient Strengths and Limitations:       Addictive Behavior:   Addictive Behavior  In the Past 3 Months, Have You Felt or Has Someone Told You That You Have a Problem With  : Other (comment) (Pt states all due to his ps4.  Pt states ps4 has a porn problem not him.)    Medical Problems:   Past Medical History:   Diagnosis Date    Anxiety     Chronic back pain        Status EXAM:  Mental Status and Behavioral Exam  Normal: No  Level of Assistance: Independent/Self  Facial Expression: Elevated, Worried  Affect: Inappropriate  Level of Consciousness: Alert  Frequency of Checks: 4 times per hour, close  Mood:Normal: No  Mood: Anxious, Irritable  Motor Activity:Normal: Yes  Eye Contact: Fair  Observed Behavior: Agitated  Sexual Misconduct History: Current - no  Preception: [place] to person, [place] to time, [place] to place, [place] to situation  Attention:Normal: No  Attention: Distractible  Thought Processes: Circumstantial  Thought Content:Normal: No  Thought Content: Delusions, Paranoia  Depression Symptoms: Impaired concentration, Increased irritability, Sleep disturbance  Anxiety Symptoms: Generalized  Irma Symptoms: Less need to sleep, Poor judgment  Hallucinations: Visual (comment), Auditory (comment) (pt states only when he plays his ps4)  Delusions: Yes  Delusions: Paranoid  Memory:Normal: No  Memory: Poor recent  Insight and Judgment: No  Insight and Judgment: Poor judgment, Poor insight    Tobacco Screening:  Practical Counseling, on admission, murphy X, if applicable and completed (first 3 are required if patient doesn't refuse):            ( )  Recognizing danger situations
11.0 K/uL Final    RBC 12/07/2023 5.28  4.20 - 5.90 M/uL Final    Hemoglobin 12/07/2023 14.6  13.5 - 17.5 g/dL Final    Hematocrit 12/07/2023 43.6  40.5 - 52.5 % Final    MCV 12/07/2023 82.6  80.0 - 100.0 fL Final    MCH 12/07/2023 27.7  26.0 - 34.0 pg Final    MCHC 12/07/2023 33.6  31.0 - 36.0 g/dL Final    RDW 12/07/2023 13.5  12.4 - 15.4 % Final    Platelets 26/62/5141 416  135 - 450 K/uL Final    MPV 12/07/2023 8.3  5.0 - 10.5 fL Final    Neutrophils % 12/07/2023 66.2  % Final    Lymphocytes % 12/07/2023 23.8  % Final    Monocytes % 12/07/2023 8.6  % Final    Eosinophils % 12/07/2023 1.0  % Final    Basophils % 12/07/2023 0.4  % Final    Neutrophils Absolute 12/07/2023 8.8 (H)  1.7 - 7.7 K/uL Final    Lymphocytes Absolute 12/07/2023 3.2  1.0 - 5.1 K/uL Final    Monocytes Absolute 12/07/2023 1.1  0.0 - 1.3 K/uL Final    Eosinophils Absolute 12/07/2023 0.1  0.0 - 0.6 K/uL Final    Basophils Absolute 12/07/2023 0.1  0.0 - 0.2 K/uL Final            Medications  Current Facility-Administered Medications: acetaminophen (TYLENOL) tablet 650 mg, 650 mg, Oral, Q4H PRN  ibuprofen (ADVIL;MOTRIN) tablet 400 mg, 400 mg, Oral, Q6H PRN  magnesium hydroxide (MILK OF MAGNESIA) 400 MG/5ML suspension 30 mL, 30 mL, Oral, Daily PRN  nicotine (NICODERM CQ) 21 MG/24HR 1 patch, 1 patch, TransDERmal, Daily  nicotine polacrilex (COMMIT) lozenge 2 mg, 2 mg, Oral, Q1H PRN  aluminum & magnesium hydroxide-simethicone (MAALOX) 200-200-20 MG/5ML suspension 30 mL, 30 mL, Oral, Q6H PRN  OLANZapine (ZYPREXA) tablet 10 mg, 10 mg, Oral, Q4H PRN **OR** OLANZapine (ZyPREXA) 10 mg in sterile water 2 mL injection, 10 mg, IntraMUSCular, Q4H PRN  benztropine mesylate (COGENTIN) injection 2 mg, 2 mg, IntraMUSCular, BID PRN  traZODone (DESYREL) tablet 50 mg, 50 mg, Oral, Nightly PRN    ASSESSMENT AND PLAN    Principal Problem:    Psychosis, paranoid ([address])  Active Problems:    Cannabis abuse  Resolved Problems:    * No resolved hospital problems.  *     1.
calculated using previous equations. The CKD-EPI equation is less accurate in patients with  extremes of muscle mass, extra-renal metabolism of  creatinine, excessive creatinine ingestion, or following  therapy that affects renal tubular secretion. Calcium 12/07/2023 9.4  8.3 - 10.6 mg/dL Final    WBC 12/07/2023 13.3 (H)  4.0 - 11.0 K/uL Final    RBC 12/07/2023 5.28  4.20 - 5.90 M/uL Final    Hemoglobin 12/07/2023 14.6  13.5 - 17.5 g/dL Final    Hematocrit 12/07/2023 43.6  40.5 - 52.5 % Final    MCV 12/07/2023 82.6  80.0 - 100.0 fL Final    MCH 12/07/2023 27.7  26.0 - 34.0 pg Final    MCHC 12/07/2023 33.6  31.0 - 36.0 g/dL Final    RDW 12/07/2023 13.5  12.4 - 15.4 % Final    Platelets 94/71/8944 416  135 - 450 K/uL Final    MPV 12/07/2023 8.3  5.0 - 10.5 fL Final    Neutrophils % 12/07/2023 66.2  % Final    Lymphocytes % 12/07/2023 23.8  % Final    Monocytes % 12/07/2023 8.6  % Final    Eosinophils % 12/07/2023 1.0  % Final    Basophils % 12/07/2023 0.4  % Final    Neutrophils Absolute 12/07/2023 8.8 (H)  1.7 - 7.7 K/uL Final    Lymphocytes Absolute 12/07/2023 3.2  1.0 - 5.1 K/uL Final    Monocytes Absolute 12/07/2023 1.1  0.0 - 1.3 K/uL Final    Eosinophils Absolute 12/07/2023 0.1  0.0 - 0.6 K/uL Final    Basophils Absolute 12/07/2023 0.1  0.0 - 0.2 K/uL Final    Total Protein 12/07/2023 8.0  6.4 - 8.2 g/dL Final    Albumin 12/07/2023 4.5  3.4 - 5.0 g/dL Final    Alkaline Phosphatase 12/07/2023 94  40 - 129 U/L Final    ALT 12/07/2023 19  10 - 40 U/L Final    AST 12/07/2023 12 (L)  15 - 37 U/L Final    Total Bilirubin 12/07/2023 0.4  0.0 - 1.0 mg/dL Final    Bilirubin, Direct 12/07/2023 <0.2  0.0 - 0.3 mg/dL Final    Bilirubin, Indirect 12/07/2023 see below  0.0 - 1.0 mg/dL Final    Comment: Indirect Bilirubin cannot be calculated since Total Bilirubin  and/or Direct Bilirubin is below measurable range.       Color, UA 12/07/2023 Yellow  Straw/Yellow Final    Clarity, UA 12/07/2023 Clear  Clear Final
UA 12/07/2023 6.5  5.0 - 8.0 Final    Protein, UA 12/07/2023 Negative  Negative mg/dL Final    Urobilinogen, Urine 12/07/2023 0.2  <2.0 E.U./dL Final    Nitrite, Urine 12/07/2023 Negative  Negative Final    Leukocyte Esterase, Urine 12/07/2023 TRACE (A)  Negative Final    Microscopic Examination 12/07/2023 YES   Final    Urine Type 12/07/2023 NotGiven   Final    Urine received in a container without preservatives. Urine Reflex to Culture 12/07/2023 Not Indicated   Final    Mucus, UA 12/07/2023 1+ (A)  None Seen /LPF Final    WBC, UA 12/07/2023 0-2  0 - 5 /HPF Final    RBC, UA 12/07/2023 0-2  0 - 4 /HPF Final    Epithelial Cells, UA 12/07/2023 0-1  0 - 5 /HPF Final    Bacteria, UA 12/07/2023 Rare (A)  None Seen /HPF Final            Medications  Current Facility-Administered Medications: mirtazapine (REMERON) tablet 15 mg, 15 mg, Oral, Nightly  OLANZapine (ZYPREXA) tablet 5 mg, 5 mg, Oral, BID  hydrOXYzine HCl (ATARAX) tablet 50 mg, 50 mg, Oral, Q6H PRN  acetaminophen (TYLENOL) tablet 650 mg, 650 mg, Oral, Q4H PRN  ibuprofen (ADVIL;MOTRIN) tablet 400 mg, 400 mg, Oral, Q6H PRN  magnesium hydroxide (MILK OF MAGNESIA) 400 MG/5ML suspension 30 mL, 30 mL, Oral, Daily PRN  nicotine (NICODERM CQ) 21 MG/24HR 1 patch, 1 patch, TransDERmal, Daily  nicotine polacrilex (COMMIT) lozenge 2 mg, 2 mg, Oral, Q1H PRN  aluminum & magnesium hydroxide-simethicone (MAALOX) 200-200-20 MG/5ML suspension 30 mL, 30 mL, Oral, Q6H PRN  OLANZapine (ZYPREXA) tablet 10 mg, 10 mg, Oral, Q4H PRN **OR** OLANZapine (ZyPREXA) 10 mg in sterile water 2 mL injection, 10 mg, IntraMUSCular, Q4H PRN  benztropine mesylate (COGENTIN) injection 2 mg, 2 mg, IntraMUSCular, BID PRN    ASSESSMENT AND PLAN    Principal Problem:    Psychosis, paranoid (H[facility])  Active Problems:    Cannabis abuse    Elevated lipase    Pyuria    Psychosis, unspecified psychosis type (H[facility])  Resolved Problems:    * No resolved hospital problems.  *     Increase Zyprexa to 7.5 mg po BID

## 2023-12-10 NOTE — PLAN OF CARE
Pt. Is calm and cooperative. Guarded but receptive to care. Withdrawn most of the shift, but has ventured out to the TV periodically. Limited but appropriate social interaction. Med compliant. Denies SI/HI AVH. Problem: Irma  Goal: Will exhibit normal sleep and speech and no impulsivity  Description: INTERVENTIONS:  1. Administer medication as ordered  2. Set limits on impulsive behavior  3. Make attempts to decrease external stimuli as possible  Outcome: Progressing     Problem: Psychosis  Goal: Will report no hallucinations or delusions  Description: INTERVENTIONS:  1. Administer medication as  ordered  2. Assist with reality testing to support increasing orientation  3. Assess if patient's hallucinations or delusions are encouraging self harm or harm to others and intervene as appropriate  Outcome: Progressing     Problem: Behavior  Goal: Pt/Family maintain appropriate behavior and adhere to behavioral management agreement, if implemented  Description: INTERVENTIONS:  1. Assess patient/family's coping skills and  non-compliant behavior (including use of illegal substances)  2. Notify security of behavior or suspected illegal substances which indicate the need for search of the family and/or belongings  3. Encourage verbalization of thoughts and concerns in a socially appropriate manner  4. Utilize positive, consistent limit setting strategies supporting safety of patient, staff and others  5. Encourage participation in the decision making process about the behavioral management agreement  6. If a visitor's behavior poses a threat to safety call refer to organization policy.   7. Initiate consult with , Psychosocial CNS, Spiritual Care as appropriate  Outcome: Progressing

## 2023-12-11 VITALS
BODY MASS INDEX: 26.26 KG/M2 | WEIGHT: 173.28 LBS | DIASTOLIC BLOOD PRESSURE: 69 MMHG | OXYGEN SATURATION: 97 % | TEMPERATURE: 97.7 F | HEIGHT: 68 IN | RESPIRATION RATE: 16 BRPM | SYSTOLIC BLOOD PRESSURE: 132 MMHG | HEART RATE: 73 BPM

## 2023-12-11 PROCEDURE — 99239 HOSP IP/OBS DSCHRG MGMT >30: CPT | Performed by: PSYCHIATRY & NEUROLOGY

## 2023-12-11 PROCEDURE — 5130000000 HC BRIDGE APPOINTMENT

## 2023-12-11 PROCEDURE — 6370000000 HC RX 637 (ALT 250 FOR IP): Performed by: PSYCHIATRY & NEUROLOGY

## 2023-12-11 PROCEDURE — 6370000000 HC RX 637 (ALT 250 FOR IP): Performed by: NURSE PRACTITIONER

## 2023-12-11 RX ORDER — OLANZAPINE 7.5 MG/1
7.5 TABLET, FILM COATED ORAL 2 TIMES DAILY
Qty: 60 TABLET | Refills: 0 | Status: SHIPPED | OUTPATIENT
Start: 2023-12-11

## 2023-12-11 RX ORDER — MIRTAZAPINE 15 MG/1
15 TABLET, FILM COATED ORAL NIGHTLY
Qty: 30 TABLET | Refills: 0 | Status: SHIPPED | OUTPATIENT
Start: 2023-12-11

## 2023-12-11 RX ORDER — NICOTINE 21 MG/24HR
1 PATCH, TRANSDERMAL 24 HOURS TRANSDERMAL DAILY
Qty: 30 PATCH | Refills: 3 | Status: CANCELLED | OUTPATIENT
Start: 2023-12-12

## 2023-12-11 RX ADMIN — NICOTINE POLACRILEX 2 MG: 2 LOZENGE ORAL at 08:51

## 2023-12-11 RX ADMIN — OLANZAPINE 7.5 MG: 5 TABLET, FILM COATED ORAL at 08:52

## 2023-12-11 NOTE — PLAN OF CARE
Ricky Bryant is alert and oriented X3. He is pleasant and cooperative with staff. He states he lives alone and is not allowed at his mothers house, but he does depend on her for all emotional support. He states he lives alone and spends most of his time in his bedroom. He has no friends. He states he plays with his dog sometimes. He denies any SI/HI. He denies AVH and has not been noted to be RTIS.

## 2023-12-11 NOTE — BH NOTE
[facility]  Discharge Note    Pt discharged with followings belongings:   Dental Appliances: None  Vision - Corrective Lenses: None  Hearing Aid: None  Jewelry: None  Body Piercings Removed: N/A  Clothing: Footwear, Socks, Pants, Shirt, Jacket/Coat  Other Valuables: Other (Comment) (none)   Valuables returned to patient. Patient educated on aftercare instructions: Yes  Information faxed to [facility] by MICHELLE Mccray  at 3:04 PM .Patient verbalize understanding of AVS:  Yes. Status EXAM upon discharge:  Mental Status and Behavioral Exam  Normal: No  Level of Assistance: Independent/Self  Facial Expression: Brightened  Affect: Congruent  Level of Consciousness: Alert  Frequency of Checks: 4 times per hour, close  Mood:Normal: Yes  Mood: Anxious  Motor Activity:Normal: Yes  Eye Contact: Good  Observed Behavior: Cooperative, Friendly  Sexual Misconduct History: Current - no  Preception: [place] to person, [place] to time, [place] to place, [place] to situation  Attention:Normal: Yes  Attention: Distractible  Thought Processes: Unremarkable (Linear)  Thought Content:Normal: No  Thought Content: Poverty of content  Depression Symptoms: No problems reported or observed. Anxiety Symptoms: No problems reported or observed. Irma Symptoms: No problems reported or observed.   Hallucinations: None  Delusions: No  Delusions: Paranoid  Memory:Normal: No  Memory: Poor recent  Insight and Judgment: No  Insight and Judgment: Poor judgment, Poor insight    Tobacco Screening:  Practical Counseling, on admission, murphy X, if applicable and completed (first 3 are required if patient doesn't refuse):            ( ) Recognizing danger situations (included triggers and roadblocks)                    ( ) Coping skills (new ways to manage stress,relaxation techniques, changing routine, distraction)                                                           ( ) Basic information about quitting (benefits of quitting,

## 2023-12-11 NOTE — BH NOTE
Patient alert and oriented x 4, pleasant and cooperate, compliant with medications. Visible on unit but sat in corner alone. Reports he is antisocial. Reports anxiety 4/10, denies depression stated he is never depressed. Maalox 30 ml PO effective patient resting. Will monitor.

## 2023-12-11 NOTE — DISCHARGE SUMMARY
calculated without  a race factor using the 2021 CKD-EPI equation. Careful  clinical correlation is recommended, particularly when  comparing to results calculated using previous equations. The CKD-EPI equation is less accurate in patients with  extremes of muscle mass, extra-renal metabolism of  creatinine, excessive creatinine ingestion, or following  therapy that affects renal tubular secretion.       Calcium 12/07/2023 9.4  8.3 - 10.6 mg/dL Final    WBC 12/07/2023 13.3 (H)  4.0 - 11.0 K/uL Final    RBC 12/07/2023 5.28  4.20 - 5.90 M/uL Final    Hemoglobin 12/07/2023 14.6  13.5 - 17.5 g/dL Final    Hematocrit 12/07/2023 43.6  40.5 - 52.5 % Final    MCV 12/07/2023 82.6  80.0 - 100.0 fL Final    MCH 12/07/2023 27.7  26.0 - 34.0 pg Final    MCHC 12/07/2023 33.6  31.0 - 36.0 g/dL Final    RDW 12/07/2023 13.5  12.4 - 15.4 % Final    Platelets 88/23/1607 416  135 - 450 K/uL Final    MPV 12/07/2023 8.3  5.0 - 10.5 fL Final    Neutrophils % 12/07/2023 66.2  % Final    Lymphocytes % 12/07/2023 23.8  % Final    Monocytes % 12/07/2023 8.6  % Final    Eosinophils % 12/07/2023 1.0  % Final    Basophils % 12/07/2023 0.4  % Final    Neutrophils Absolute 12/07/2023 8.8 (H)  1.7 - 7.7 K/uL Final    Lymphocytes Absolute 12/07/2023 3.2  1.0 - 5.1 K/uL Final    Monocytes Absolute 12/07/2023 1.1  0.0 - 1.3 K/uL Final    Eosinophils Absolute 12/07/2023 0.1  0.0 - 0.6 K/uL Final    Basophils Absolute 12/07/2023 0.1  0.0 - 0.2 K/uL Final    Total Protein 12/07/2023 8.0  6.4 - 8.2 g/dL Final    Albumin 12/07/2023 4.5  3.4 - 5.0 g/dL Final    Alkaline Phosphatase 12/07/2023 94  40 - 129 U/L Final    ALT 12/07/2023 19  10 - 40 U/L Final    AST 12/07/2023 12 (L)  15 - 37 U/L Final    Total Bilirubin 12/07/2023 0.4  0.0 - 1.0 mg/dL Final    Bilirubin, Direct 12/07/2023 <0.2  0.0 - 0.3 mg/dL Final    Bilirubin, Indirect 12/07/2023 see below  0.0 - 1.0 mg/dL Final    Comment: Indirect Bilirubin cannot be calculated since Total

## 2023-12-11 NOTE — PLAN OF CARE
Problem: Pain  Goal: Verbalizes/displays adequate comfort level or baseline comfort level  Outcome: Progressing     Problem: Irma  Goal: Will exhibit normal sleep and speech and no impulsivity  Description: INTERVENTIONS:  1. Administer medication as ordered  2. Set limits on impulsive behavior  3. Make attempts to decrease external stimuli as possible  12/11/2023 0011 by Lei Kevin LPN  Outcome: Progressing     Problem: Psychosis  Goal: Will report no hallucinations or delusions  Description: INTERVENTIONS:  1. Administer medication as  ordered  2. Assist with reality testing to support increasing orientation  3. Assess if patient's hallucinations or delusions are encouraging self harm or harm to others and intervene as appropriate  12/11/2023 0011 by Lei Kevin LPN  Outcome: Progressing     Problem: Behavior  Goal: Pt/Family maintain appropriate behavior and adhere to behavioral management agreement, if implemented  Description: INTERVENTIONS:  1. Assess patient/family's coping skills and  non-compliant behavior (including use of illegal substances)  2. Notify security of behavior or suspected illegal substances which indicate the need for search of the family and/or belongings  3. Encourage verbalization of thoughts and concerns in a socially appropriate manner  4. Utilize positive, consistent limit setting strategies supporting safety of patient, staff and others  5. Encourage participation in the decision making process about the behavioral management agreement  6. If a visitor's behavior poses a threat to safety call refer to organization policy. 7. Initiate consult with , Psychosocial CNS, Spiritual Care as appropriate  12/11/2023 0011 by Lei Kevin LPN  Outcome: Progressing     Problem: Anxiety  Goal: Will report anxiety at manageable levels  Description: INTERVENTIONS:  1. Administer medication as ordered  2. Teach and rehearse alternative coping skills  3.  Provide emotional

## 2023-12-13 ENCOUNTER — FOLLOWUP TELEPHONE ENCOUNTER (OUTPATIENT)
Dept: PSYCHIATRY | Age: 33
End: 2023-12-13

## 2023-12-14 ENCOUNTER — FOLLOWUP TELEPHONE ENCOUNTER (OUTPATIENT)
Dept: PSYCHIATRY | Age: 33
End: 2023-12-14

## 2023-12-15 ENCOUNTER — FOLLOWUP TELEPHONE ENCOUNTER (OUTPATIENT)
Dept: PSYCHIATRY | Age: 33
End: 2023-12-15

## 2024-11-11 ENCOUNTER — HOSPITAL ENCOUNTER (EMERGENCY)
Age: 34
Discharge: PSYCHIATRIC OR SUBSTANCE USE FACILITY | End: 2024-11-12
Attending: EMERGENCY MEDICINE
Payer: MEDICAID

## 2024-11-11 DIAGNOSIS — F22 DELUSIONS (HCC): ICD-10-CM

## 2024-11-11 DIAGNOSIS — F22 PARANOIA (HCC): Primary | ICD-10-CM

## 2024-11-11 DIAGNOSIS — F23 ACUTE PSYCHOSIS (HCC): ICD-10-CM

## 2024-11-11 PROCEDURE — 99285 EMERGENCY DEPT VISIT HI MDM: CPT

## 2024-11-11 ASSESSMENT — PAIN - FUNCTIONAL ASSESSMENT: PAIN_FUNCTIONAL_ASSESSMENT: NONE - DENIES PAIN

## 2024-11-12 ENCOUNTER — HOSPITAL ENCOUNTER (INPATIENT)
Age: 34
LOS: 3 days | Discharge: HOME | End: 2024-11-15
Attending: PSYCHIATRY & NEUROLOGY | Admitting: PSYCHIATRY & NEUROLOGY
Payer: MEDICAID

## 2024-11-12 VITALS
TEMPERATURE: 97.7 F | SYSTOLIC BLOOD PRESSURE: 106 MMHG | WEIGHT: 165 LBS | HEIGHT: 67 IN | OXYGEN SATURATION: 99 % | BODY MASS INDEX: 25.9 KG/M2 | DIASTOLIC BLOOD PRESSURE: 60 MMHG | RESPIRATION RATE: 20 BRPM | HEART RATE: 71 BPM

## 2024-11-12 PROBLEM — Z72.0 TOBACCO ABUSE: Status: ACTIVE | Noted: 2024-11-12

## 2024-11-12 PROBLEM — D72.829 LEUKOCYTOSIS: Status: ACTIVE | Noted: 2024-11-12

## 2024-11-12 LAB
ALBUMIN SERPL-MCNC: 4.5 G/DL (ref 3.4–5)
ALBUMIN/GLOB SERPL: 1.3 {RATIO} (ref 1.1–2.2)
ALP SERPL-CCNC: 85 U/L (ref 40–129)
ALT SERPL-CCNC: 8 U/L (ref 10–40)
AMPHETAMINES UR QL SCN>1000 NG/ML: ABNORMAL
ANION GAP SERPL CALCULATED.3IONS-SCNC: 13 MMOL/L (ref 3–16)
APAP SERPL-MCNC: <5 UG/ML (ref 10–30)
AST SERPL-CCNC: 17 U/L (ref 15–37)
BARBITURATES UR QL SCN>200 NG/ML: ABNORMAL
BASOPHILS # BLD: 0.1 K/UL (ref 0–0.2)
BASOPHILS NFR BLD: 0.4 %
BENZODIAZ UR QL SCN>200 NG/ML: ABNORMAL
BILIRUB SERPL-MCNC: 0.4 MG/DL (ref 0–1)
BUN SERPL-MCNC: 14 MG/DL (ref 7–20)
CALCIUM SERPL-MCNC: 9.4 MG/DL (ref 8.3–10.6)
CANNABINOIDS UR QL SCN>50 NG/ML: POSITIVE
CHLORIDE SERPL-SCNC: 101 MMOL/L (ref 99–110)
CO2 SERPL-SCNC: 24 MMOL/L (ref 21–32)
COCAINE UR QL SCN: ABNORMAL
CREAT SERPL-MCNC: 1.1 MG/DL (ref 0.9–1.3)
DEPRECATED RDW RBC AUTO: 13.5 % (ref 12.4–15.4)
DRUG SCREEN COMMENT UR-IMP: ABNORMAL
EOSINOPHIL # BLD: 0 K/UL (ref 0–0.6)
EOSINOPHIL NFR BLD: 0.3 %
ETHANOLAMINE SERPL-MCNC: NORMAL MG/DL (ref 0–0.08)
FENTANYL SCREEN, URINE: ABNORMAL
GFR SERPLBLD CREATININE-BSD FMLA CKD-EPI: >90 ML/MIN/{1.73_M2}
GLUCOSE SERPL-MCNC: 97 MG/DL (ref 70–99)
HCT VFR BLD AUTO: 42.2 % (ref 40.5–52.5)
HGB BLD-MCNC: 14.5 G/DL (ref 13.5–17.5)
LYMPHOCYTES # BLD: 2.1 K/UL (ref 1–5.1)
LYMPHOCYTES NFR BLD: 14.7 %
MCH RBC QN AUTO: 28.1 PG (ref 26–34)
MCHC RBC AUTO-ENTMCNC: 34.4 G/DL (ref 31–36)
MCV RBC AUTO: 81.7 FL (ref 80–100)
METHADONE UR QL SCN>300 NG/ML: ABNORMAL
MONOCYTES # BLD: 1.1 K/UL (ref 0–1.3)
MONOCYTES NFR BLD: 7.4 %
NEUTROPHILS # BLD: 11 K/UL (ref 1.7–7.7)
NEUTROPHILS NFR BLD: 77.2 %
OPIATES UR QL SCN>300 NG/ML: ABNORMAL
OXYCODONE UR QL SCN: ABNORMAL
PCP UR QL SCN>25 NG/ML: ABNORMAL
PH UR STRIP: 5 [PH]
PLATELET # BLD AUTO: 433 K/UL (ref 135–450)
PMV BLD AUTO: 7.6 FL (ref 5–10.5)
POTASSIUM SERPL-SCNC: 3.6 MMOL/L (ref 3.5–5.1)
PROT SERPL-MCNC: 8 G/DL (ref 6.4–8.2)
RBC # BLD AUTO: 5.17 M/UL (ref 4.2–5.9)
SALICYLATES SERPL-MCNC: 0.5 MG/DL (ref 15–30)
SODIUM SERPL-SCNC: 138 MMOL/L (ref 136–145)
WBC # BLD AUTO: 14.2 K/UL (ref 4–11)

## 2024-11-12 PROCEDURE — 90791 PSYCH DIAGNOSTIC EVALUATION: CPT | Performed by: SOCIAL WORKER

## 2024-11-12 PROCEDURE — 1240000000 HC EMOTIONAL WELLNESS R&B

## 2024-11-12 PROCEDURE — 6370000000 HC RX 637 (ALT 250 FOR IP): Performed by: PSYCHIATRY & NEUROLOGY

## 2024-11-12 PROCEDURE — 82077 ASSAY SPEC XCP UR&BREATH IA: CPT

## 2024-11-12 PROCEDURE — 6370000000 HC RX 637 (ALT 250 FOR IP): Performed by: EMERGENCY MEDICINE

## 2024-11-12 PROCEDURE — 80307 DRUG TEST PRSMV CHEM ANLYZR: CPT

## 2024-11-12 PROCEDURE — 80143 DRUG ASSAY ACETAMINOPHEN: CPT

## 2024-11-12 PROCEDURE — 99222 1ST HOSP IP/OBS MODERATE 55: CPT

## 2024-11-12 PROCEDURE — 80179 DRUG ASSAY SALICYLATE: CPT

## 2024-11-12 PROCEDURE — 99223 1ST HOSP IP/OBS HIGH 75: CPT | Performed by: PSYCHIATRY & NEUROLOGY

## 2024-11-12 PROCEDURE — 85025 COMPLETE CBC W/AUTO DIFF WBC: CPT

## 2024-11-12 PROCEDURE — 80053 COMPREHEN METABOLIC PANEL: CPT

## 2024-11-12 RX ORDER — PALIPERIDONE 3 MG/1
6 TABLET, EXTENDED RELEASE ORAL DAILY
Status: DISCONTINUED | OUTPATIENT
Start: 2024-11-12 | End: 2024-11-15 | Stop reason: HOSPADM

## 2024-11-12 RX ORDER — OLANZAPINE 10 MG/1
10 TABLET, ORALLY DISINTEGRATING ORAL ONCE
Status: COMPLETED | OUTPATIENT
Start: 2024-11-12 | End: 2024-11-12

## 2024-11-12 RX ORDER — MIRTAZAPINE 30 MG/1
30 TABLET, FILM COATED ORAL NIGHTLY PRN
Status: DISCONTINUED | OUTPATIENT
Start: 2024-11-12 | End: 2024-11-15 | Stop reason: HOSPADM

## 2024-11-12 RX ORDER — MAGNESIUM HYDROXIDE/ALUMINUM HYDROXICE/SIMETHICONE 120; 1200; 1200 MG/30ML; MG/30ML; MG/30ML
30 SUSPENSION ORAL EVERY 6 HOURS PRN
Status: DISCONTINUED | OUTPATIENT
Start: 2024-11-12 | End: 2024-11-15 | Stop reason: HOSPADM

## 2024-11-12 RX ORDER — POLYETHYLENE GLYCOL 3350 17 G
2 POWDER IN PACKET (EA) ORAL
Status: DISCONTINUED | OUTPATIENT
Start: 2024-11-12 | End: 2024-11-15 | Stop reason: HOSPADM

## 2024-11-12 RX ORDER — OLANZAPINE 5 MG/1
5 TABLET ORAL 3 TIMES DAILY PRN
Status: DISCONTINUED | OUTPATIENT
Start: 2024-11-12 | End: 2024-11-15 | Stop reason: HOSPADM

## 2024-11-12 RX ORDER — HYDROXYZINE HYDROCHLORIDE 50 MG/1
50 TABLET, FILM COATED ORAL 3 TIMES DAILY PRN
Status: DISCONTINUED | OUTPATIENT
Start: 2024-11-12 | End: 2024-11-15 | Stop reason: HOSPADM

## 2024-11-12 RX ORDER — ACETAMINOPHEN 325 MG/1
650 TABLET ORAL EVERY 8 HOURS PRN
Status: DISCONTINUED | OUTPATIENT
Start: 2024-11-12 | End: 2024-11-15 | Stop reason: HOSPADM

## 2024-11-12 RX ORDER — TRAZODONE HYDROCHLORIDE 50 MG/1
50 TABLET, FILM COATED ORAL NIGHTLY PRN
Status: DISCONTINUED | OUTPATIENT
Start: 2024-11-12 | End: 2024-11-12

## 2024-11-12 RX ADMIN — NICOTINE POLACRILEX 2 MG: 2 LOZENGE ORAL at 15:34

## 2024-11-12 RX ADMIN — OLANZAPINE 10 MG: 10 TABLET, ORALLY DISINTEGRATING ORAL at 02:19

## 2024-11-12 RX ADMIN — NICOTINE POLACRILEX 2 MG: 2 LOZENGE ORAL at 20:17

## 2024-11-12 RX ADMIN — PALIPERIDONE 6 MG: 3 TABLET, EXTENDED RELEASE ORAL at 11:34

## 2024-11-12 ASSESSMENT — PATIENT HEALTH QUESTIONNAIRE - PHQ9
SUM OF ALL RESPONSES TO PHQ QUESTIONS 1-9: 20
1. LITTLE INTEREST OR PLEASURE IN DOING THINGS: MORE THAN HALF THE DAYS
10. IF YOU CHECKED OFF ANY PROBLEMS, HOW DIFFICULT HAVE THESE PROBLEMS MADE IT FOR YOU TO DO YOUR WORK, TAKE CARE OF THINGS AT HOME, OR GET ALONG WITH OTHER PEOPLE: VERY DIFFICULT
SUM OF ALL RESPONSES TO PHQ QUESTIONS 1-9: 20
7. TROUBLE CONCENTRATING ON THINGS, SUCH AS READING THE NEWSPAPER OR WATCHING TELEVISION: NEARLY EVERY DAY
5. POOR APPETITE OR OVEREATING: MORE THAN HALF THE DAYS
2. FEELING DOWN, DEPRESSED OR HOPELESS: MORE THAN HALF THE DAYS
6. FEELING BAD ABOUT YOURSELF - OR THAT YOU ARE A FAILURE OR HAVE LET YOURSELF OR YOUR FAMILY DOWN: SEVERAL DAYS
SUM OF ALL RESPONSES TO PHQ QUESTIONS 1-9: 20
SUM OF ALL RESPONSES TO PHQ QUESTIONS 1-9: 2
SUM OF ALL RESPONSES TO PHQ QUESTIONS 1-9: 2
8. MOVING OR SPEAKING SO SLOWLY THAT OTHER PEOPLE COULD HAVE NOTICED. OR THE OPPOSITE, BEING SO FIGETY OR RESTLESS THAT YOU HAVE BEEN MOVING AROUND A LOT MORE THAN USUAL: NEARLY EVERY DAY
1. LITTLE INTEREST OR PLEASURE IN DOING THINGS: NEARLY EVERY DAY
SUM OF ALL RESPONSES TO PHQ QUESTIONS 1-9: 20
9. THOUGHTS THAT YOU WOULD BE BETTER OFF DEAD, OR OF HURTING YOURSELF: NOT AT ALL
SUM OF ALL RESPONSES TO PHQ9 QUESTIONS 1 & 2: 5
SUM OF ALL RESPONSES TO PHQ QUESTIONS 1-9: 2
SUM OF ALL RESPONSES TO PHQ QUESTIONS 1-9: 2
SUM OF ALL RESPONSES TO PHQ9 QUESTIONS 1 & 2: 2
2. FEELING DOWN, DEPRESSED OR HOPELESS: NOT AT ALL
3. TROUBLE FALLING OR STAYING ASLEEP: NEARLY EVERY DAY
4. FEELING TIRED OR HAVING LITTLE ENERGY: NEARLY EVERY DAY

## 2024-11-12 ASSESSMENT — SLEEP AND FATIGUE QUESTIONNAIRES
AVERAGE NUMBER OF SLEEP HOURS: 4
SLEEP PATTERN: DIFFICULTY FALLING ASLEEP;INSOMNIA
DO YOU USE A SLEEP AID: NO
DO YOU HAVE DIFFICULTY SLEEPING: YES
AVERAGE NUMBER OF SLEEP HOURS: 4
DO YOU HAVE DIFFICULTY SLEEPING: YES
SLEEP PATTERN: DIFFICULTY FALLING ASLEEP;DISTURBED/INTERRUPTED SLEEP
DO YOU USE A SLEEP AID: NO

## 2024-11-12 ASSESSMENT — LIFESTYLE VARIABLES
HOW MANY STANDARD DRINKS CONTAINING ALCOHOL DO YOU HAVE ON A TYPICAL DAY: PATIENT DOES NOT DRINK
HOW OFTEN DO YOU HAVE A DRINK CONTAINING ALCOHOL: NEVER

## 2024-11-12 ASSESSMENT — PAIN SCALES - GENERAL: PAINLEVEL_OUTOF10: 0

## 2024-11-12 NOTE — H&P
Bindu Beltrán Patient Age: 36 year old  MESSAGE: Interpreting service used: No      Patient calling stating that she has noticed at night when she takes off the walking boot that she was given to wear that from her calf down it is very cold to the touch.  She is wondering if this is a result of the walking boot and asking what she can do.  Please advise.   Routing to triage for further assistance.       ALLERGIES:  Sulfa antibiotics  Current Outpatient Medications   Medication   • Segesterone-Ethinyl Estradiol (Annovera) 0.15-0.013 MG/24HR RING   • clindamycin-benzoyl peroxide (BENZACLIN) 1-5 % gel   • Tretinoin Microsphere (Retin-A Micro Pump) 0.06 % Gel   • albuterol 108 (90 Base) MCG/ACT inhaler   • cetirizine (ZyrTEC) 10 MG tablet   • acetaminophen (TYLENOL) 325 MG tablet   • docusate sodium (Colace) 100 MG capsule   • HYDROcodone-acetaminophen (NORCO) 5-325 MG per tablet   • ibuprofen (MOTRIN) 600 MG tablet     No current facility-administered medications for this visit.     PHARMACY to use:           Pharmacy preference(s) on file:   [id number] - [place], [place] - [address] AT [facility] [address]  [address]  [facility] [address]  Phone: [phone] Fax: [phone]      CALL BACK INFO: Ok to leave response (including medical information) on answering machine      PCP: Pedro Luis Marcos MD         INS: Payor: [employer] [place] / Plan: PPO [id number] / Product Type: PPO MISC   PATIENT ADDRESS:  [address] [place] [id number]     INITIAL PSYCHIATRIC HISTORY AND PHYSICAL      Patient name: Anderson Ortiz  Admit date: 11/12/2024  Today's date: 11/12/2024           CC:  psychosis    HPI:   Patient seen in room on Adult Behavioral Unit.   Patient is a 34 y.o. male who presented to the ED at [facility] with psychosis. Patient had been on [facility] 12/2023 with similar presentation and has not been in treatment since that admission and had stopped all meds. He said \" my phone is acting weird.\"     He presented with delusions that his text messages were being altered by hackers and they want to kill him and his family. He stated that the numbers on his phone change into \"references to movies and games.\" He believed that he had put a hit on his family.   He lives at home with his dog and he state that he doesn't go outside. His mother brings groceries daily and he doesn't drive as he feels that he will wreck and harm others. He mentions that he is feeling more depressed and his thoughts are racing  and he cannot control or slow that thoughts.   He appears concerned about taking any medication  but was willing to take something to calm his racing thoughts.   He denied any SI/HI thoughts nor A/V drew.    PAST PSYCHIATRIC HISTORY:    [facility] 12/6-12/11/2023   He reports his PlayStation 4 is \"possessed and sex hungry.\" He continues to describe the PlayStation is responsible for sending him subliminal messages all relating to sexual content. Patient asked to elaborate and he says it \"makes porn out of regular movies.\" He said it has taken over his life and now is everywhere, not just on his PlayStation. He sees these subliminal messages on every electronic, business, and words. He stood up to show me and example. The wall reads \"Behavioral Health\" and he was able to read this accurately. However, he pointed in between letters like \"B,\" \"a,\" and \"o\" saying they look like \"sexual holes.\" He said this has been going on for 2 years now. It began 2 years ago on  logical, [] circumstantial, [] tangential, [] BLANE,     [] simplistic, [x] disorganized  Associations:  [] logical and coherent, [] loosening, [x] disorganized  Insight:   [] good, [] fair, [x] poor  Judgment:  [] good, [] fair, [x] poor  Attention and concentration:     [x] intact, [] limited, [] able to focus on interview,     [] grossly impaired  Orientation:  [x] person, place, time, situation     [] disoriented to:     Memory:  Remote memory [x] intact, [] impaired     Recent memory  [x] intact, [] impaired          IMPRESSION    Principal Problem:    Psychosis, paranoid (HCC)  Active Problems:    Cannabis abuse    Tobacco abuse    Leukocytosis  Resolved Problems:    * No resolved hospital problems. *       ______  Dx: axis I: Psychosis, paranoid (HCC)   Axis 2: No diagnosis   Neena 3: See Medical History  Patient Active Problem List    Diagnosis Date Noted    Tobacco abuse 11/12/2024    Leukocytosis 11/12/2024    Psychosis, unspecified psychosis type (HCC) 12/08/2023    Elevated lipase 12/07/2023    Pyuria 12/07/2023    Psychosis, paranoid (HCC) 12/06/2023    Psychosis (HCC) 12/06/2023    Cannabis abuse 12/06/2023    And Present on Admission:   Psychosis, paranoid (HCC)   Cannabis abuse   Tobacco abuse   Leukocytosis    Axis 4: Problems related to the social environment    Axis 5: 21-30 behavior considerably influenced by delusions or hallucinations OR serious impairment in judgment, communication OR inability to function in almost all areas   All conditions on Axis 1 and Axis 2 and active Axis 3 problems are being treated while patient is hospitalized.   Active Hospital Problems    Diagnosis Date Noted    Tobacco abuse [Z72.0] 11/12/2024    Leukocytosis [D72.829] 11/12/2024    Psychosis, paranoid (HCC) [F22] 12/06/2023    Cannabis abuse [F12.10] 12/06/2023     Tx plan:    prevent self injury, stabilize affect, restore sleep, treat depression, treat anxiety, establish/maintain aftercare, increase coping

## 2024-11-12 NOTE — PLAN OF CARE
4 Eyes Skin Assessment     The patient is being assessed for  Admission    I agree that 2 RN's have performed a thorough Head to Toe Skin Assessment on the patient. ALL assessment sites listed below have been assessed.       Areas assessed for pressure by both nurses:   [x]   Head, Face, and Ears   [x]   Shoulders, Back, and Chest  [x]   Arms, Elbows, and Hands   [x]   Coccyx, Sacrum, and Ischum  [x]   Legs, Feet, and Heels-patient refused assessment of feet                                Skin Assessed Under all Medical Devices by both nurses:  N/A               All Mepilex Borders were peeled back and area peeked at by both nurses:  No: n/a  Please list where Mepilex Borders are located:  n/a                 Does the Patient have Skin Breakdown related to pressure?   no               Hugo Prevention initiated:  NA   Wound Care Orders initiated:  NA      [facility] nurse consulted for Pressure Injury (Stage 3,4, Unstageable, DTI, NWPT, Complex wounds)and New or Established Ostomies:  NA        Nurse 1 eSignature: Electronically signed by Mary Schaefer RN on 11/12/24 at 10:39 AM EST    **SHARE this note so that the co-signing nurse is able to place an eSignature**    Nurse 2 eSignature: Electronically signed by Maryann Lara RN on 11/12/24 at 10:40 AM EST

## 2024-11-12 NOTE — ED PROVIDER NOTES
use: Yes     Types: Marijuana (Weed)    Sexual activity: Not on file   Other Topics Concern    Not on file   Social History Narrative    Not on file     Social Determinants of Health     Financial Resource Strain: Not on file   Food Insecurity: No Food Insecurity (12/6/2023)    Hunger Vital Sign     Worried About Running Out of Food in the Last Year: Never true     Ran Out of Food in the Last Year: Never true   Transportation Needs: No Transportation Needs (12/6/2023)    PRAPARE - Transportation     Lack of Transportation (Medical): No     Lack of Transportation (Non-Medical): No   Physical Activity: Not on file   Stress: Not on file   Social Connections: Not on file   Intimate Partner Violence: Not on file   Housing Stability: Low Risk  (12/6/2023)    Housing Stability Vital Sign     Unable to Pay for Housing in the Last Year: No     Number of Places Lived in the Last Year: 1     Unstable Housing in the Last Year: No       CURRENT MEDICATIONS  No current facility-administered medications for this encounter.     Current Outpatient Medications   Medication Sig Dispense Refill    OLANZapine (ZYPREXA) 7.5 MG tablet Take 1 tablet by mouth 2 times daily (Patient not taking: Reported on 11/11/2024) 60 tablet 0    mirtazapine (REMERON) 15 MG tablet Take 1 tablet by mouth nightly (Patient not taking: Reported on 11/11/2024) 30 tablet 0    ibuprofen (ADVIL;MOTRIN) 800 MG tablet Take 1 tablet by mouth every 6 hours as needed for Pain. (Patient not taking: Reported on 11/11/2024) 40 tablet 0       ALLERGIES  Allergies   Allergen Reactions    Grass Pollen(K-O-R-T-Swt Vaibhav)      Pt states fresh cut grass makes him sick.     Pcn [Penicillins]      Rash        PHYSICAL EXAM  VITAL SIGNS:    ED Triage Vitals [11/11/24 4004]   Encounter Vitals Group      /71      Systolic BP Percentile       Diastolic BP Percentile       Pulse (!) 111      Respirations 18      Temp 98.6 °F (37 °C)      Temp src       SpO2 97 %      Weight -

## 2024-11-12 NOTE — PLAN OF CARE
[facility]  Treatment Team Note  Review Date & Time: 11/12/24  0952    Patient was not in treatment team      Status EXAM:   Mental Status and Behavioral Exam  Normal: No  Level of Assistance: Independent/Self  Facial Expression: Worried, Elevated  Affect: Blunt, Unstable  Level of Consciousness: Alert  Frequency of Checks: 4 times per hour, close  Mood:Normal: No  Mood: Anxious  Motor Activity:Normal: Yes  Motor Activity: Other (comment) (wnl)  Eye Contact: Good  Observed Behavior: Cooperative, Agitated, Preoccupied  Sexual Misconduct History: Current - no  Preception: Cyrus to person, Cyrus to time, Cyrus to place, Cyrus to situation  Attention:Normal: No  Attention: Distractible  Thought Processes: Circumstantial, Flight of ideas  Thought Content:Normal: No  Thought Content: Delusions, Poverty of content  Depression Symptoms: No problems reported or observed.  Anxiety Symptoms: Generalized  Irma Symptoms: No problems reported or observed.  Hallucinations: None  Delusions: Yes  Delusions: Paranoid  Memory:Normal: Yes  Insight and Judgment: No  Insight and Judgment: Poor judgment, Poor insight      Suicide Risk CSSR-S:  1) Within the past month, have you wished you were dead or wished you could go to sleep and not wake up? : No  2) Have you actually had any thoughts of killing yourself? : No  6) Have you ever done anything, started to do anything, or prepared to do anything to end your life?: No      PLAN/TREATMENT RECOMMENDATIONS UPDATE:   Patient will take medication as prescribed, eat 75% of meals, attend groups, participate in milieu activities, participate in treatment team and care planning for discharge and follow up.           Mary Schaefer RN

## 2024-11-12 NOTE — PLAN OF CARE
Problem: Psychosis  Goal: Will report no hallucinations or delusions  Description: INTERVENTIONS:  1. Administer medication as  ordered  2. Assist with reality testing to support increasing orientation  3. Assess if patient's hallucinations or delusions are encouraging self harm or harm to others and intervene as appropriate  Outcome: Progressing     Problem: Behavior  Goal: Pt/Family maintain appropriate behavior and adhere to behavioral management agreement, if implemented  Description: INTERVENTIONS:  1. Assess patient/family's coping skills and  non-compliant behavior (including use of illegal substances)  2. Notify security of behavior or suspected illegal substances which indicate the need for search of the family and/or belongings  3. Encourage verbalization of thoughts and concerns in a socially appropriate manner  4. Utilize positive, consistent limit setting strategies supporting safety of patient, staff and others  5. Encourage participation in the decision making process about the behavioral management agreement  6. If a visitor's behavior poses a threat to safety call refer to organization policy.  7. Initiate consult with , Psychosocial CNS, Spiritual Care as appropriate  Outcome: Progressing     Problem: Anxiety  Goal: Will report anxiety at manageable levels  Description: INTERVENTIONS:  1. Administer medication as ordered  2. Teach and rehearse alternative coping skills  3. Provide emotional support with 1:1 interaction with staff  Outcome: Progressing     Problem: Sleep Disturbance  Goal: Will exhibit normal sleeping pattern  Description: INTERVENTIONS:  1. Administer medication as ordered  2. Decrease environmental stimuli, including noise, as appropriate  3. Discourage social isolation and naps during the day  Outcome: Progressing

## 2024-11-12 NOTE — PROGRESS NOTES
Patient arrived to unit via transport, accompanied by  security and transport staff.  He was wanded and brought through security check point.  Patient was not in a safety gown and did have hospital pants with strings on.  He was shown to assigned room. Patient is notably paranoid.  Vitals obtained and patient was asked to change into safety gown.  Patient was cooperative with changing out gown, but refused to remove hospital pants that have strings.  He denies SI/HI, so pants were left on d/t patient not being cooperative.  Provider notified.

## 2024-11-12 NOTE — PROGRESS NOTES
Behavioral Services  Medicare Certification Upon Admission    I certify that this patient's inpatient psychiatric hospital admission is medically necessary for:    [x] (1) Treatment which could reasonably be expected to improve this patient's condition,       [x] (2) Or for diagnostic study;     AND     [x](2) The inpatient psychiatric services are provided while the individual is under the care of a physician and are included in the individualized plan of care.    Estimated length of stay/service 5 d    Plan for post-hospital care outpt    Electronically signed by JW POTTS MD on 11/12/2024 at 10:41 AM

## 2024-11-12 NOTE — VIRTUAL HEALTH
Anderson Ortiz  [id number]  1990     Social Work Behavioral Health Crisis Assessment    11/12/24    Chief Complaint: Pt comes in experiencing paranoia and visual hallucinations    HPI: Patient is a 34 y.o. White (non-) male who presents for a psych eval. Patient presented to the ED on 11/12/24 from home.    Per patient, his play station and phone have been acting weird and have been hacked. \"I feel like I've gotten my entire family killed for some reason.\"    Pt states something like this happens at the same time each year and his mother thinks it's seasonal depression. He endorses insomnia, mood swings, racing thoughts, hyperactivity, and intermittent depression and paranoia.    Patient reports he has not been taking any psychiatric medications or receiving outpatient psychiatric treatment.     Past Psychiatric History:  Previous Diagnoses/symptoms: Schizoaffective bipolar type, paranoia, Anxiety  Previous suicide attempts/self-harm: history of SI  Inpatient psychiatric hospitalizations: yes  Current outpatient psychiatric provider: denies  Current therapist: States not in therapy  Previous psychiatric medication trials: Depakote, Risperdal, Trazodone, Seroquel, Klonopin, Vistaril  Current psychiatric medications: No current psychiatric medications  Family Psychiatric History: Denies    Sleep Hours: 4-5    Sleep concerns:  getting less and less sleep    Use of sleep medications: denies    Substance Abuse History:  Tobacco: Denies  Alcohol: Denies  Marijuana:  UDS +Cannabinoid  Stimulant: Denies  Opiates: Denies  Benzodiazepine: Denies  Other illicit drug usage: Denies  History of substance/alcohol abuse treatment: Denies    Social History:  Education: H.S.  Living Situation/Interest: alone  Marital/Committed relationship and parenting hx: single  Occupation: Unemployed  Legal History/Hx of Violence: Denies  Spiritual History: Denies  Psychological trauma, neglect, or abuse: emotional  Access to guns or

## 2024-11-12 NOTE — CARE COORDINATION
LPC completed psychosocial assessment, leisure assessment, CSSR-S and OQ Analyst with patient at bedside. Patient was preoccupied with the idea that his phone was hacked/is possessed. Patient was cooperative in answering questions.      24 1705   Psychiatric History   Psychiatric history treatment Psychiatric admissions   Contact information denies any current treatment   Are there any medication issues? No  (no medications)   Recent Psychological Experiences Other(comment)  (reports his phone has been hacked or is \"acting weird\")   Support System   Support system Access to others   Types of Support System Mother   Problems in support system Lack of friends/family   Current Living Situation   Home Living Adequate   Living information Lives alone   Problems with living situation  No   Lack of basic needs No   SSDI/SSI n/a   Other government assistance n/a   Problems with environment n/a   Current abuse issues n/a   Supervised setting None   Relationship problems No   Contact information n/a   Medical and Self-Care Issues   Relevant medical problems back pain   Relevant self-care issues n/a   Barriers to treatment Yes   Barriers Inability to afford   Family Constellation   Spouse/partner-name/age n/a   Children-names/ages n/a   Parents Diamond, reports father completed suicide   Siblings says he has half brothers/sisters but doesn't know them   Contact information n/a   Support services Other(Comment)  (n/a)   Comment n/a   Childhood   Raised by Biological mother;Grandparent(s)   Biological mother Diamond   Relevant family history dad  by suicide   History of abuse No   Comment n/a   Legal History   Legal history No   Other relevant legal issues n/a   Comment n/a   Juvenile legal history No   Abuse Assessment   Physical abuse Denies   Verbal abuse Denies   Emotional abuse Denies   Financial abuse Denies   Sexual abuse Denies   Possible abuse reported to None needed   Substance Use   Use of substances  Yes    Substance 1   Substance used Marijuana   Amount/frequency/route daily for back pain   Last use/History yesterday   Motivation for SA Treatment   Stage of engagement Pre-engagement/engagement   Motivation for treatment Yes   Current barriers to treatment Financial/insurance;Lack of support system   Comment n/a   Education   Education HS graduate -GED   Special education Other  (n/a)   Work History   Currently employed No   Recent job loss or change Other (Comment)  (n/a)    service Other   /[facility] involvement n/a   Cultural and Spiritual   Spiritual concerns No   Cultural concerns No   Comment n/a   Collateral Contacts   Contacts Other (Comment)  (n/a)     Martina Powell, LPC

## 2024-11-12 NOTE — ED NOTES
Patient in room waiting for Tele-psych to connect and speak to patient   
Patient oriented to room and ED throughput process.  Safety measures with ED bed locked in lowest position and call light in reach.  Patient educated on all orders, including any medications.  Patient educated on chief complaint/symptoms. Patient encouraged to ask questions regarding care, medications or treatment plan.  Patient aware of how to reach staff with questions/concerns.   
Patient sleeping, no signs of distress,  present   
Patient spoke with Tele-Psych and placed on a hold. Item removed from room for safety patient placed in gown and clothes and belongings given to security.  at bedside   
Pt keeps coming out of the room thinking signs or numbers are directed to him, concerned about the numbers on his hospital ID band meaning things that are directed at him, very paranoid behavior. Unable to sit on the bed, pacing in the room or standing at the doorway   
Report called to Celeste Renee acknowledged understanding and denied any need for further information. Patient awaiting transport   
Report given to transport team on departure   
AST 17 11/12/2024 12:07 AM    ALT 8 11/12/2024 12:07 AM     Drug Panel: No results found for: \"AMPHETAMUR\", \"BARBITURATUR\", \"COCAINEUR\", \"METHADU\", \"OPIAU\", \"THCUR\", \"LABCOMM\"  UA:  Lab Results   Component Value Date/Time    COLORU Yellow 12/07/2023 12:45 PM    PROTEINU Negative 12/07/2023 12:45 PM    GLUCOSEU Negative 12/07/2023 12:45 PM    KETUA Negative 12/07/2023 12:45 PM    BILIRUBINUR Negative 12/07/2023 12:45 PM    BLOODU Negative 12/07/2023 12:45 PM    UROBILINOGEN 0.2 12/07/2023 12:45 PM    NITRU Negative 12/07/2023 12:45 PM    LEUKOCYTESUR TRACE 12/07/2023 12:45 PM    WBCUA 0-2 12/07/2023 12:45 PM    RBCUA 0-2 12/07/2023 12:45 PM    BACTERIA Rare 12/07/2023 12:45 PM     PREGNANCY TEST: No results found for: \"PREGTESTUR\"    Dialysis: No    Target Destination: Psychiatric Facility    Insurance: Payor: /      Current Psychotic Symptoms  Harmful Actions Towards Others:    Orientation Level:    Speech Pattern:    General Attitude:    Emotions:    Delusions:    Hallucination:       Any Suicidal Ideations: No Risk    Homicidal Ideations/Violence Risk:   Observed Violent Behavior: No  Homicidal Ideations: No    Past Psychiatric History:       Diagnosis Date    Anxiety     Chronic back pain        Hold (TDO/Involuntary Hold): Yes    The patient is not currently receiving care for the above psychiatric illness.     Home Medications  Does Patient Have Medications to Bring to the Facility: No  Medications Prior to Admission:   Prior to Admission Medications   Prescriptions Last Dose Informant Patient Reported? Taking?   OLANZapine (ZYPREXA) 7.5 MG tablet Not Taking  No No   Sig: Take 1 tablet by mouth 2 times daily   Patient not taking: Reported on 11/11/2024   ibuprofen (ADVIL;MOTRIN) 800 MG tablet Not Taking  No No   Sig: Take 1 tablet by mouth every 6 hours as needed for Pain.   Patient not taking: Reported on 11/11/2024   mirtazapine (REMERON) 15 MG tablet Not Taking  No No   Sig: Take 1 tablet by mouth nightly

## 2024-11-12 NOTE — PROGRESS NOTES
Patient refusing to sign admission papers, due to them being labeled and having numbers on the top.  Patient is paranoid concerning numbers. Patient gave verbal consent to all admission papers except Voluntary admission.

## 2024-11-12 NOTE — PLAN OF CARE
[facility]  Admission Note     Admission Type:   Involuntary - Not Signed in Upon Admission     Reason for admission:  Reason for Admission: Paranoid      Addictive Behavior:   Addictive Behavior  In the Past 3 Months, Have You Felt or Has Someone Told You That You Have a Problem With  : None    Medical Problems:   Past Medical History:   Diagnosis Date    Anxiety     Chronic back pain        Status EXAM:  Mental Status and Behavioral Exam  Normal: No  Level of Assistance: Independent/Self  Facial Expression: Worried, Elevated  Affect: Blunt, Unstable  Level of Consciousness: Alert  Frequency of Checks: 4 times per hour, close  Mood:Normal: No  Mood: Anxious  Motor Activity:Normal: Yes  Motor Activity: Other (comment) (wnl)  Eye Contact: Good  Observed Behavior: Cooperative, Agitated, Preoccupied  Sexual Misconduct History: Current - no  Preception: [place] to person, [place] to time, [place] to place, [place] to situation  Attention:Normal: No  Attention: Distractible  Thought Processes: Circumstantial, Flight of ideas  Thought Content:Normal: No  Thought Content: Delusions, Poverty of content  Depression Symptoms: No problems reported or observed.  Anxiety Symptoms: Generalized  Irma Symptoms: No problems reported or observed.  Hallucinations: None  Delusions: Yes  Delusions: Paranoid  Memory:Normal: Yes  Insight and Judgment: No  Insight and Judgment: Poor judgment, Poor insight    Tobacco Screening:  Practical Counseling, on admission, murphy X, if applicable and completed (first 3 are required if patient doesn't refuse):            ( ) Recognizing danger situations (included triggers and roadblocks)                    ( ) Coping skills (new ways to manage stress,relaxation techniques, changing routine, distraction)                                                           ( ) Basic information about quitting (benefits of quitting, techniques in how to quit, available resources  ( ) Referral for

## 2024-11-12 NOTE — H&P
Hospital Medicine History & Physical      PCP: Nancy Navas MD    Date of Admission: 11/12/2024    Date of Service: Pt seen/examined on 11/12/2024     Chief Complaint:  No chief complaint on file.        History Of Present Illness:      The patient is a 34 y.o. male with anxiety who presented to ED for paranoia.  Patient was seen and evaluated in the ED by the ED medical provider, patient was medically cleared for admission to [facility].  This note serves as an admission medical H&P.    Tobacco use: cigarettes, vapes constantly throughout the day.  ETOH use: once a year on his birthday.  Illicit drug use: marijuana     Patient reports \"something going on with my toenails\". Says they are tender and \"turning yellow\". Refuses to allow provider to assess.    Past Medical History:        Diagnosis Date    Anxiety     Chronic back pain        Past Surgical History:    History reviewed. No pertinent surgical history.    Medications Prior to Admission:    Prior to Admission medications    Medication Sig Start Date End Date Taking? Authorizing Provider   OLANZapine (ZYPREXA) 7.5 MG tablet Take 1 tablet by mouth 2 times daily  Patient not taking: Reported on 11/11/2024 12/11/23   Santiago Doty MD   mirtazapine (REMERON) 15 MG tablet Take 1 tablet by mouth nightly  Patient not taking: Reported on 11/11/2024 12/11/23   Santiago Doty MD   ibuprofen (ADVIL;MOTRIN) 800 MG tablet Take 1 tablet by mouth every 6 hours as needed for Pain.  Patient not taking: Reported on 11/11/2024 4/17/15   Jesus Evans PA-C       Allergies:  Grass pollen(k-o-r-t-swt ricardo), Pcn [penicillins], and Sausage [pickled meat]    Social History:  The patient currently lives alone.    TOBACCO:   reports that he has quit smoking. His smoking use included cigarettes. He has never used smokeless tobacco.  ETOH:   reports that he does not currently use alcohol.      Family History:   Positive as follows:    History reviewed. No pertinent

## 2024-11-13 PROCEDURE — 1240000000 HC EMOTIONAL WELLNESS R&B

## 2024-11-13 PROCEDURE — 6370000000 HC RX 637 (ALT 250 FOR IP): Performed by: PSYCHIATRY & NEUROLOGY

## 2024-11-13 PROCEDURE — 99233 SBSQ HOSP IP/OBS HIGH 50: CPT

## 2024-11-13 RX ADMIN — NICOTINE POLACRILEX 2 MG: 2 LOZENGE ORAL at 18:39

## 2024-11-13 RX ADMIN — NICOTINE POLACRILEX 2 MG: 2 LOZENGE ORAL at 10:22

## 2024-11-13 RX ADMIN — PALIPERIDONE 6 MG: 3 TABLET, EXTENDED RELEASE ORAL at 08:32

## 2024-11-13 RX ADMIN — MIRTAZAPINE 30 MG: 30 TABLET, FILM COATED ORAL at 21:21

## 2024-11-13 RX ADMIN — NICOTINE POLACRILEX 2 MG: 2 LOZENGE ORAL at 14:23

## 2024-11-13 RX ADMIN — HYDROXYZINE HYDROCHLORIDE 50 MG: 50 TABLET, FILM COATED ORAL at 21:21

## 2024-11-13 NOTE — BH NOTE
Patient alert and oriented x 3. Patient visible on the milieu with minimal interaction with peers. Patient denies SI/HI/A/V/H. Patient doesn't have HS medications scheduled. No angry  outbursts noted. No c/o's voiced @ present.

## 2024-11-13 NOTE — PROGRESS NOTES
Group Therapy Note    Date: 11/12/2024  Start Time: 20:00  End Time:  21:00  Number of Participants: 8    Type of Group: Recreational  coping skills    Wellness Binder Information  Module Name:  /  Session Number:  /    Patient's Goal:  coping skills    Notes:  continuing to work on goal    Status After Intervention:  Unchanged    Participation Level: Active Listener and Interactive    Participation Quality: Appropriate, Attentive, and Sharing      Speech:  pressured      Thought Process/Content: Logical      Affective Functioning: Blunted      Mood: anxious      Level of consciousness:  Alert and Attentive      Response to Learning: Able to change behavior      Endings: None Reported    Modes of Intervention: Socialization and Problem-solving      Discipline Responsible: Behavorial Health Tech      Signature:  Idris Coello

## 2024-11-13 NOTE — PROGRESS NOTES
Department of Psychiatry  AttendingProgress Note  Chief Complaint: Psychosis    Patient's chart was reviewed and collaborated with  about the treatment plan.    SUBJECTIVE:    Pt tolerating Invega, agreeable to continue PO.  We discussed starting a CAMPBELL, but he he is resistant to this this, states he is afraid of all needles unless they are for tattoos.  Pt with interrupted sleep, states frequent checks wake him up.  Remains focused on numbers, demons, but less prominent in his conversations.  States he is feeling much better    Plan  Continue Invega 6 mg daily    Patient is feeling better. Suicidal ideation:  denies suicidal ideation.  Patient does not have medication side effects.    ROS: Patient has new complaints: no  Sleeping adequately:  Yes   Appetite adequate: Yes  Attending groups: Yes  Visitors:No    OBJECTIVE    Physical  VITALS:  /75   Pulse 82   Temp 97.7 °F (36.5 °C) (Oral)   Resp 18   Ht 1.702 m (5' 7\")   Wt 84.1 kg (185 lb 6.5 oz)   SpO2 97%   BMI 29.04 kg/m²     Mental Status Examination:  Patients appearance was well-appearing, street clothes, and in chair. Thoughts are Logical. Homicidal ideations none.  No abnormal movements, tics or mannerisms.  Memory intact Aims 0. Concentration Fair.   Alert and oriented X 4. Insight and Judgement delusions. Patient was cooperative. Patient gait normal. Mood constricted, affect flat affect Hallucinations Absent, suicidal ideations no specific plan to harm self Speech normal pitch and normal volume    Data  Labs:   Admission on 11/11/2024, Discharged on 11/12/2024   Component Date Value Ref Range Status    WBC 11/12/2024 14.2 (H)  4.0 - 11.0 K/uL Final    RBC 11/12/2024 5.17  4.20 - 5.90 M/uL Final    Hemoglobin 11/12/2024 14.5  13.5 - 17.5 g/dL Final    Hematocrit 11/12/2024 42.2  40.5 - 52.5 % Final    MCV 11/12/2024 81.7  80.0 - 100.0 fL Final    MCH 11/12/2024 28.1  26.0 - 34.0 pg Final    MCHC 11/12/2024 34.4  31.0 - 36.0 g/dL  mg/dL  Toxic: >30.0 mg/dL              Medications  Current Facility-Administered Medications: acetaminophen (TYLENOL) tablet 650 mg, 650 mg, Oral, Q8H PRN  nicotine polacrilex (COMMIT) lozenge 2 mg, 2 mg, Oral, Q1H PRN  hydrOXYzine HCl (ATARAX) tablet 50 mg, 50 mg, Oral, TID PRN  OLANZapine (ZYPREXA) tablet 5 mg, 5 mg, Oral, TID PRN **OR** OLANZapine (ZyPREXA) 5 mg in sterile water 1 mL injection, 5 mg, IntraMUSCular, TID PRN  paliperidone (INVEGA) extended release tablet 6 mg, 6 mg, Oral, Daily  mirtazapine (REMERON) tablet 30 mg, 30 mg, Oral, Nightly PRN  magnesium hydroxide (MILK OF MAGNESIA) 400 MG/5ML suspension 30 mL, 30 mL, Oral, Daily PRN  aluminum & magnesium hydroxide-simethicone (MAALOX) 200-200-20 MG/5ML suspension 30 mL, 30 mL, Oral, Q6H PRN    ASSESSMENT AND PLAN    Principal Problem:    Psychosis, paranoid (HCC)  Active Problems:    Cannabis abuse    Tobacco abuse    Leukocytosis  Resolved Problems:    * No resolved hospital problems. *       1.Patient's symptoms   are improving  2.Probable discharge is next week  3.Discharge planning is incomplete  4. Suicidal ideation is better  5. Total time with patient was 40 minutes and more than 50 % of that time was spent counseling the patient on their symptoms, treatment and expected goals.     Shanda Renee, PMHNP-BC

## 2024-11-13 NOTE — PLAN OF CARE
Patient appears calmer than yesterday but continues to exhibit reference delusions thinking TV commercials are personally directed at him. He is mistrustful of technology because everything \"relates back to numbers and Lucifer\". He also doesn't keep his ID band on him because of the numbers. He is not as bothered by these delusions as he is more social with peers and visible on the unit. Denies HI/SI. In the morning he became anxious when mom didn't  his call but after talking to her, he was more relaxed. Compliant with medications and ate his meals. Will continue to monitor and provide support as needed.     Problem: Pain  Goal: Verbalizes/displays adequate comfort level or baseline comfort level  Outcome: Progressing     Problem: Risk for Elopement  Goal: Patient will not exit the unit/facility without proper excort  Outcome: Progressing  Flowsheets (Taken 11/13/2024 7490)  Nursing Interventions for Elopement Risk: Reduce environmental triggers     Problem: Psychosis  Goal: Will report no hallucinations or delusions  Description: INTERVENTIONS:  1. Administer medication as  ordered  2. Assist with reality testing to support increasing orientation  3. Assess if patient's hallucinations or delusions are encouraging self harm or harm to others and intervene as appropriate  Outcome: Progressing     Problem: Behavior  Goal: Pt/Family maintain appropriate behavior and adhere to behavioral management agreement, if implemented  Description: INTERVENTIONS:  1. Assess patient/family's coping skills and  non-compliant behavior (including use of illegal substances)  2. Notify security of behavior or suspected illegal substances which indicate the need for search of the family and/or belongings  3. Encourage verbalization of thoughts and concerns in a socially appropriate manner  4. Utilize positive, consistent limit setting strategies supporting safety of patient, staff and others  5. Encourage participation in the

## 2024-11-13 NOTE — PROGRESS NOTES
Anderson came to the team station and requested and received commit pauline. 2 mg po for c/o nicotine cravings/withdraw. He said he's used them in the past and that they have been effective quickly.

## 2024-11-13 NOTE — PLAN OF CARE
Problem: Pain  Goal: Verbalizes/displays adequate comfort level or baseline comfort level  Outcome: Progressing     Problem: Risk for Elopement  Goal: Patient will not exit the unit/facility without proper excort  Outcome: Progressing     Problem: Psychosis  Goal: Will report no hallucinations or delusions  Description: INTERVENTIONS:  1. Administer medication as  ordered  2. Assist with reality testing to support increasing orientation  3. Assess if patient's hallucinations or delusions are encouraging self harm or harm to others and intervene as appropriate  11/12/2024 2019 by Ryann Allen RN  Outcome: Progressing  11/12/2024 0934 by Mary Schaefer RN  Outcome: Progressing     Problem: Behavior  Goal: Pt/Family maintain appropriate behavior and adhere to behavioral management agreement, if implemented  Description: INTERVENTIONS:  1. Assess patient/family's coping skills and  non-compliant behavior (including use of illegal substances)  2. Notify security of behavior or suspected illegal substances which indicate the need for search of the family and/or belongings  3. Encourage verbalization of thoughts and concerns in a socially appropriate manner  4. Utilize positive, consistent limit setting strategies supporting safety of patient, staff and others  5. Encourage participation in the decision making process about the behavioral management agreement  6. If a visitor's behavior poses a threat to safety call refer to organization policy.  7. Initiate consult with , Psychosocial CNS, Spiritual Care as appropriate  11/12/2024 2019 by Ryann Allen RN  Outcome: Progressing  11/12/2024 0934 by Mary Schaefer RN  Outcome: Progressing     Problem: Anxiety  Goal: Will report anxiety at manageable levels  Description: INTERVENTIONS:  1. Administer medication as ordered  2. Teach and rehearse alternative coping skills  3. Provide emotional support with 1:1 interaction with  staff  11/12/2024 2019 by Ryann Allen, RN  Outcome: Progressing  11/12/2024 0934 by Mary Schaefer RN  Outcome: Progressing     Problem: Sleep Disturbance  Goal: Will exhibit normal sleeping pattern  Description: INTERVENTIONS:  1. Administer medication as ordered  2. Decrease environmental stimuli, including noise, as appropriate  3. Discourage social isolation and naps during the day  11/12/2024 2019 by Ryann Allen RN  Outcome: Progressing  11/12/2024 0934 by Mary Schaefer RN  Outcome: Progressing

## 2024-11-14 PROCEDURE — 6370000000 HC RX 637 (ALT 250 FOR IP): Performed by: PSYCHIATRY & NEUROLOGY

## 2024-11-14 PROCEDURE — 1240000000 HC EMOTIONAL WELLNESS R&B

## 2024-11-14 PROCEDURE — 99233 SBSQ HOSP IP/OBS HIGH 50: CPT

## 2024-11-14 RX ADMIN — NICOTINE POLACRILEX 2 MG: 2 LOZENGE ORAL at 13:22

## 2024-11-14 RX ADMIN — NICOTINE POLACRILEX 2 MG: 2 LOZENGE ORAL at 10:37

## 2024-11-14 RX ADMIN — NICOTINE POLACRILEX 2 MG: 2 LOZENGE ORAL at 18:27

## 2024-11-14 RX ADMIN — NICOTINE POLACRILEX 2 MG: 2 LOZENGE ORAL at 21:20

## 2024-11-14 RX ADMIN — NICOTINE POLACRILEX 2 MG: 2 LOZENGE ORAL at 04:11

## 2024-11-14 RX ADMIN — NICOTINE POLACRILEX 2 MG: 2 LOZENGE ORAL at 06:56

## 2024-11-14 RX ADMIN — PALIPERIDONE 6 MG: 3 TABLET, EXTENDED RELEASE ORAL at 08:48

## 2024-11-14 ASSESSMENT — PAIN SCALES - GENERAL
PAINLEVEL_OUTOF10: 10
PAINLEVEL_OUTOF10: 0

## 2024-11-14 ASSESSMENT — PAIN DESCRIPTION - ONSET: ONSET: ON-GOING

## 2024-11-14 ASSESSMENT — PAIN DESCRIPTION - FREQUENCY: FREQUENCY: CONTINUOUS

## 2024-11-14 ASSESSMENT — PAIN DESCRIPTION - ORIENTATION: ORIENTATION: LOWER

## 2024-11-14 ASSESSMENT — PAIN DESCRIPTION - DESCRIPTORS: DESCRIPTORS: ACHING

## 2024-11-14 ASSESSMENT — PAIN DESCRIPTION - LOCATION: LOCATION: BACK

## 2024-11-14 ASSESSMENT — PAIN DESCRIPTION - PAIN TYPE: TYPE: CHRONIC PAIN

## 2024-11-14 ASSESSMENT — PAIN - FUNCTIONAL ASSESSMENT: PAIN_FUNCTIONAL_ASSESSMENT: ACTIVITIES ARE NOT PREVENTED

## 2024-11-14 NOTE — PROGRESS NOTES
Hemoglobin 11/12/2024 14.5  13.5 - 17.5 g/dL Final    Hematocrit 11/12/2024 42.2  40.5 - 52.5 % Final    MCV 11/12/2024 81.7  80.0 - 100.0 fL Final    MCH 11/12/2024 28.1  26.0 - 34.0 pg Final    MCHC 11/12/2024 34.4  31.0 - 36.0 g/dL Final    RDW 11/12/2024 13.5  12.4 - 15.4 % Final    Platelets 11/12/2024 433  135 - 450 K/uL Final    MPV 11/12/2024 7.6  5.0 - 10.5 fL Final    Neutrophils % 11/12/2024 77.2  % Final    Lymphocytes % 11/12/2024 14.7  % Final    Monocytes % 11/12/2024 7.4  % Final    Eosinophils % 11/12/2024 0.3  % Final    Basophils % 11/12/2024 0.4  % Final    Neutrophils Absolute 11/12/2024 11.0 (H)  1.7 - 7.7 K/uL Final    Lymphocytes Absolute 11/12/2024 2.1  1.0 - 5.1 K/uL Final    Monocytes Absolute 11/12/2024 1.1  0.0 - 1.3 K/uL Final    Eosinophils Absolute 11/12/2024 0.0  0.0 - 0.6 K/uL Final    Basophils Absolute 11/12/2024 0.1  0.0 - 0.2 K/uL Final    Sodium 11/12/2024 138  136 - 145 mmol/L Final    Potassium reflex Magnesium 11/12/2024 3.6  3.5 - 5.1 mmol/L Final    Chloride 11/12/2024 101  99 - 110 mmol/L Final    CO2 11/12/2024 24  21 - 32 mmol/L Final    Anion Gap 11/12/2024 13  3 - 16 Final    Glucose 11/12/2024 97  70 - 99 mg/dL Final    BUN 11/12/2024 14  7 - 20 mg/dL Final    Creatinine 11/12/2024 1.1  0.9 - 1.3 mg/dL Final    Est, Glom Filt Rate 11/12/2024 >90  >60 Final    Comment: Pediatric calculator link  https://www.kidney.org/professionals/kdoqi/gfr_calculatorped  Effective Oct 3, 2022  These results are not intended for use in patients  <18 years of age.  eGFR results are calculated without  a race factor using the 2021 CKD-EPI equation.  Careful  clinical correlation is recommended, particularly when  comparing to results calculated using previous equations.  The CKD-EPI equation is less accurate in patients with  extremes of muscle mass, extra-renal metabolism of  creatinine, excessive creatinine ingestion, or following  therapy that affects renal tubular secretion.

## 2024-11-14 NOTE — PLAN OF CARE
Problem: Pain  Goal: Verbalizes/displays adequate comfort level or baseline comfort level  11/13/2024 2303 by Maryann Parikh RN  Outcome: Progressing     Problem: Risk for Elopement  Goal: Patient will not exit the unit/facility without proper excort  11/13/2024 2303 by Maryann Parikh RN  Outcome: Progressing     Problem: Psychosis  Goal: Will report no hallucinations or delusions  Description: INTERVENTIONS:  1. Administer medication as  ordered  2. Assist with reality testing to support increasing orientation  3. Assess if patient's hallucinations or delusions are encouraging self harm or harm to others and intervene as appropriate  11/13/2024 2303 by Maryann Parikh RN  Outcome: Progressing     Problem: Behavior  Goal: Pt/Family maintain appropriate behavior and adhere to behavioral management agreement, if implemented  Description: INTERVENTIONS:  1. Assess patient/family's coping skills and  non-compliant behavior (including use of illegal substances)  2. Notify security of behavior or suspected illegal substances which indicate the need for search of the family and/or belongings  3. Encourage verbalization of thoughts and concerns in a socially appropriate manner  4. Utilize positive, consistent limit setting strategies supporting safety of patient, staff and others  5. Encourage participation in the decision making process about the behavioral management agreement  6. If a visitor's behavior poses a threat to safety call refer to organization policy.  7. Initiate consult with , Psychosocial CNS, Spiritual Care as appropriate  11/13/2024 2303 by Maryann Parikh RN  Outcome: Progressing     Problem: Anxiety  Goal: Will report anxiety at manageable levels  Description: INTERVENTIONS:  1. Administer medication as ordered  2. Teach and rehearse alternative coping skills  3. Provide emotional support with 1:1 interaction with staff  11/13/2024 4061 by Jo Ann Ovalles, CLAY  Outcome:

## 2024-11-14 NOTE — PLAN OF CARE
Problem: Psychosis  Goal: Will report no hallucinations or delusions  Description: INTERVENTIONS:  1. Administer medication as  ordered  2. Assist with reality testing to support increasing orientation  3. Assess if patient's hallucinations or delusions are encouraging self harm or harm to others and intervene as appropriate  11/14/2024 1127 by Bonita Downs, RN  Outcome: Progressing   Patient denies any hallucinations and reports his delusion about the phone was just that and he knows now it is not and was not real.  He reports he would never hurt anyone.      Problem: Sleep Disturbance  Goal: Will exhibit normal sleeping pattern  Description: INTERVENTIONS:  1. Administer medication as ordered  2. Decrease environmental stimuli, including noise, as appropriate  3. Discourage social isolation and naps during the day  11/14/2024 1127 by Bonita Downs, RN  Outcome: Progressing   Reports he slept 4 hours last night and was pleased with this.  Admits sleep more when at home in a more comfortable environment but his sleep here is sufficient.      Problem: Anxiety  Goal: Will report anxiety at manageable levels  Description: INTERVENTIONS:  1. Administer medication as ordered  2. Teach and rehearse alternative coping skills  3. Provide emotional support with 1:1 interaction with staff  11/14/2024 1127 by Bonita Downs, RN  Outcome: Progressing  Patient reports his anxiety is chronic and has learned to live with it.  Encouraged to seek alternative methods to assist with anxiety besides marijuana.

## 2024-11-14 NOTE — PROGRESS NOTES
Patient has been visible and social on unit. He is cooperative with assessment. He reports anxiety related to not being able to talk to his mother. He is afraid to call her as he reports she yells at him every time he does. He denies any depressive symptoms. No si/avh. He reports he is only homicidal when playing video games. No person in particular. Patient received PRN Atarax and Remeron per orders. No distress noted.

## 2024-11-14 NOTE — BH NOTE
Patient reports he has been sleeping well and and eating well.  He denies depression but admits to chronic anxiety and only Marijuana helps although he admits it makes him a little paranoid but it is better being a little paranoid than outright anxious.  He reports mood swings from time to time but points out he night appear a \"hot mess\" to others but inside himself he feels calm and collected.  He is A&Ox4 and denies SI/HI or any A/V hallucinations.  He reports the \"phone thing\" that brought him into here he knows now was not real and he would never hurt anyone.  States his long term memory is good but short term memory is horrible.  Patient reports 10/10 pain in his lower back that is chronic.  He declined offered PRN medications and reports only Marijuana works for his pain.

## 2024-11-14 NOTE — PROGRESS NOTES
Patient remains awake, he is now expressing that he does not like his thoughts. He is nonspecific in what these thoughts are. He goes on to talk about video games and tv shows. He denies hearing voices rather it's his own thoughts. Patient talking about [place] and Lucifer. Patient feels that his mother is in danger and is unsure if this is a rational or irrational thought. Patient encouraged to call her at 0700 to check on her and is reassured that she is safe as she was yesterday. Patient says that he can watch any movie and it all makes a reference to Lucifer. Patient feels he gets overwhelmed very easily in his own thoughts. Continuing to monitor.

## 2024-11-14 NOTE — PROGRESS NOTES
Group Therapy Note    Date: 11/13/2024  Start Time: 20:00  End Time:  21:00  Number of Participants: 8    Type of Group: Recreational  wrap up    Wellness Binder Information  Module Name:  /  Session Number:  /    Patient's Goal:  coping skills    Notes:  continuing to work on goal    Status After Intervention:  Unchanged    Participation Level: Active Listener and Interactive    Participation Quality: Appropriate, Attentive, and Sharing      Speech:  pressured      Thought Process/Content: Logical      Affective Functioning: Blunted      Mood: anxious      Level of consciousness:  Alert and Attentive      Response to Learning: Able to change behavior      Endings: None Reported    Modes of Intervention: Socialization and Problem-solving      Discipline Responsible: Behavorial Health Tech      Signature:  Idris Coello

## 2024-11-14 NOTE — PROGRESS NOTES
Patient awake in dining area, unable to sleep. Denies any needs at this time. PRN medications already given. No distress noted.

## 2024-11-14 NOTE — BH NOTE
[facility]  Treatment Team Note  Review Date & Time: 11/14/24  1058    Patient was not in treatment team      Status EXAM:   Mental Status and Behavioral Exam  Normal: No  Level of Assistance: Independent/Self  Facial Expression: Brightened  Affect: Congruent  Level of Consciousness: Alert  Frequency of Checks: 4 times per hour, close  Mood:Normal: No  Mood: Suspicious, Anxious  Motor Activity:Normal: Yes  Motor Activity: Other (comment) (wnl)  Eye Contact: Good  Observed Behavior: Cooperative, Friendly  Sexual Misconduct History: Current - no  Preception: Jewell to person, [place]ell to time, [place] to place, [place] to situation  Attention:Normal: No  Attention: Distractible  Thought Processes: Perseveration  Thought Content:Normal: No  Thought Content: Paranoia, Preoccupations  Depression Symptoms: No problems reported or observed.  Anxiety Symptoms: Generalized  Irma Symptoms: No problems reported or observed.  Hallucinations: None  Delusions: No (denies paranoia had before with the phone)  Delusions: Referential  Memory:Normal: No  Memory: Poor recent  Insight and Judgment: No  Insight and Judgment: Poor judgment, Poor insight      Suicide Risk CSSR-S:  1) Within the past month, have you wished you were dead or wished you could go to sleep and not wake up? : No  2) Have you actually had any thoughts of killing yourself? : No  6) Have you ever done anything, started to do anything, or prepared to do anything to end your life?: No      PLAN/TREATMENT RECOMMENDATIONS UPDATE: Patient will take medication as prescribed, eat 75% of meals, attend groups, participate in milieu activities, participate in treatment team and care planning for discharge and follow up.           Ludivina Moran RN

## 2024-11-15 VITALS
RESPIRATION RATE: 16 BRPM | SYSTOLIC BLOOD PRESSURE: 126 MMHG | OXYGEN SATURATION: 98 % | TEMPERATURE: 97.5 F | DIASTOLIC BLOOD PRESSURE: 78 MMHG | HEIGHT: 67 IN | WEIGHT: 185.41 LBS | HEART RATE: 96 BPM | BODY MASS INDEX: 29.1 KG/M2

## 2024-11-15 PROCEDURE — 5130000000 HC BRIDGE APPOINTMENT

## 2024-11-15 PROCEDURE — 6370000000 HC RX 637 (ALT 250 FOR IP): Performed by: PSYCHIATRY & NEUROLOGY

## 2024-11-15 PROCEDURE — 99239 HOSP IP/OBS DSCHRG MGMT >30: CPT

## 2024-11-15 RX ORDER — MIRTAZAPINE 30 MG/1
30 TABLET, FILM COATED ORAL NIGHTLY PRN
Qty: 30 TABLET | Refills: 0 | Status: SHIPPED | OUTPATIENT
Start: 2024-11-15

## 2024-11-15 RX ORDER — PALIPERIDONE 6 MG/1
6 TABLET, EXTENDED RELEASE ORAL DAILY
Qty: 30 TABLET | Refills: 0 | Status: SHIPPED | OUTPATIENT
Start: 2024-11-15

## 2024-11-15 RX ADMIN — NICOTINE POLACRILEX 2 MG: 2 LOZENGE ORAL at 07:02

## 2024-11-15 RX ADMIN — PALIPERIDONE 6 MG: 3 TABLET, EXTENDED RELEASE ORAL at 09:26

## 2024-11-15 RX ADMIN — NICOTINE POLACRILEX 2 MG: 2 LOZENGE ORAL at 10:51

## 2024-11-15 NOTE — TRANSITION OF CARE
Behavioral Health Transition Record    Patient Name: Anderson Ortiz  YOB: 1990   Medical Record Number: [id number]  Date of Admission: 11/12/2024  7:53 AM   Date of Discharge: 11/15/2024    Attending Provider: Santiago Doty MD   Discharging Provider: Shanda Renee APRN - CNP    To contact this individual call [phone] and ask the  to page.  If unavailable, ask to be transferred to Behavioral Health Provider on call.  A Behavioral Health Provider will be available on call 24/7 and during holidays.    Primary Care Provider: Nancy Navas MD    Allergies   Allergen Reactions    Grass Pollen(K-O-[id number]-T-Swt Vaibhav)      Pt states fresh cut grass makes him sick.     Pcn [Penicillins]      Rash     Sausage [Pickled Meat] Nausea And Vomiting       Reason for Admission: Patient is a 34 y.o. male who presented to the ED at [facility] with psychosis. Patient stated \" my phone is acting weird.\"     PT presented with delusions that his text messages were being altered by hackers and they wanted to kill him and his family. He stated that the numbers on his phone change into \"references to movies and games.\" He believed that he had put a hit on his family. PT lives at home with his dog and he states that he doesn't go outside. His mother brings groceries daily and he doesn't drive as he feels that he will wreck and harm others. He mentioned that he was feeling more depressed and his thoughts are racing  and he cannot control or slow that thoughts        Admission Diagnosis: psychosis unspecified    The crisis number for [facility] is 281-CARE. This crisis line is available 24 hours a day, seven days a week.  The National Suicide and Crisis Hotline Number is 988.  You can call, chat, or text this number at any time to access emergency mental health services.        * No surgery found *    No results found for this visit on 11/12/24.    Immunizations administered during this encounter:   There  [place], [place] - [address] - P [phone] - F [phone]  [address], [place] [address]      Phone: [phone]   mirtazapine 30 MG tablet  paliperidone 6 MG extended release tablet         Unresulted Labs (24h ago, onward)      None            To obtain results of studies pending at discharge, please contact [phone]    Follow-up Information       Follow up With Specialties Details Why Contact Info    [facility]  Go on 11/18/2024 Mental Health Intake @ 8:30am (suggested walk in) [facility] is a provider of mental health and case management services in [facility].  Open enrollment is available.  Open enrollment is Monday - Thursday 8:00 AM to 4:00 PM, and Friday from 8:00 AM to 3:00 PM.  Please being a photo ID, insurance card, proof of address, and a list of current medicines.  If you have no insurance, please bring income statements and proof of denial of Medicaid for sliding fee services. [facility]  Address: [address] [place] [address]  Phone Number: [phone]    LISA Gatica  Go on 11/19/2024 Hospital Discharge Follow Up @ 11:00am [facility]  [address] [place] [address] [phone]             Advanced Directive:   Does the patient have an appointed surrogate decision maker? No  Does the patient have a Medical Advance Directive? No  Does the patient have a Psychiatric Advance Directive? No  If the patient does not have a surrogate or Medical Advance Directive AND Psychiatric Advance Directive, the patient was offered information on these advance directives Patient will complete at a later time    Patient Instructions: Please continue all medications until otherwise directed by physician.  [phone]    Tobacco Cessation Discharge Plan:   Is the patient a tobacco user  and needs referral for tobacco cessation? No  Patient

## 2024-11-15 NOTE — GROUP NOTE
Group Therapy Note    Date: 11/14/2024    Group Start Time: 2025  Group End Time: 2045  Group Topic: Wrap-Up    [facility]    Elizabeth Lancaster RN        Group Therapy Note    Attendees: 7       Patient's Goal:  \"Get more sleep without interruption\"    Notes: Writer educated pt on possible ways to help him sleep better at night including PRN medications and putting a washcloth under the door to minimize noise during safety check rounds. Pt stated he will keep those in mind.     Status After Intervention:  Unchanged    Participation Level: Active Listener and Interactive    Participation Quality: Appropriate, Attentive, Sharing, and Supportive      Speech:  normal      Thought Process/Content: Logical      Affective Functioning: Congruent      Mood: Calm, cooperative      Level of consciousness:  Alert      Response to Learning: Able to verbalize current knowledge/experience and Able to verbalize/acknowledge new learning      Endings: None Reported    Modes of Intervention: Socialization      Discipline Responsible: Registered Nurse      Signature:  Elizabeth Lancaster RN

## 2024-11-15 NOTE — DISCHARGE SUMMARY
agreeable to stay on medications after discharge.  He feels safe returning home today, being with his dog, supported by his mother.  Pt denies all SI/HI and AVH, feels safe discharging home at this time.      Hospital Course Patient stabilized on meds and milieu treatment.  Patient was discharged to home to continue recovery in the community.     PE: (reviewed) and labs (see medical H&PE)  Labs:    No visits with results within 2 Day(s) from this visit.   Latest known visit with results is:   Admission on 11/11/2024, Discharged on 11/12/2024   Component Date Value Ref Range Status    WBC 11/12/2024 14.2 (H)  4.0 - 11.0 K/uL Final    RBC 11/12/2024 5.17  4.20 - 5.90 M/uL Final    Hemoglobin 11/12/2024 14.5  13.5 - 17.5 g/dL Final    Hematocrit 11/12/2024 42.2  40.5 - 52.5 % Final    MCV 11/12/2024 81.7  80.0 - 100.0 fL Final    MCH 11/12/2024 28.1  26.0 - 34.0 pg Final    MCHC 11/12/2024 34.4  31.0 - 36.0 g/dL Final    RDW 11/12/2024 13.5  12.4 - 15.4 % Final    Platelets 11/12/2024 433  135 - 450 K/uL Final    MPV 11/12/2024 7.6  5.0 - 10.5 fL Final    Neutrophils % 11/12/2024 77.2  % Final    Lymphocytes % 11/12/2024 14.7  % Final    Monocytes % 11/12/2024 7.4  % Final    Eosinophils % 11/12/2024 0.3  % Final    Basophils % 11/12/2024 0.4  % Final    Neutrophils Absolute 11/12/2024 11.0 (H)  1.7 - 7.7 K/uL Final    Lymphocytes Absolute 11/12/2024 2.1  1.0 - 5.1 K/uL Final    Monocytes Absolute 11/12/2024 1.1  0.0 - 1.3 K/uL Final    Eosinophils Absolute 11/12/2024 0.0  0.0 - 0.6 K/uL Final    Basophils Absolute 11/12/2024 0.1  0.0 - 0.2 K/uL Final    Sodium 11/12/2024 138  136 - 145 mmol/L Final    Potassium reflex Magnesium 11/12/2024 3.6  3.5 - 5.1 mmol/L Final    Chloride 11/12/2024 101  99 - 110 mmol/L Final    CO2 11/12/2024 24  21 - 32 mmol/L Final    Anion Gap 11/12/2024 13  3 - 16 Final    Glucose 11/12/2024 97  70 - 99 mg/dL Final    BUN 11/12/2024 14  7 - 20 mg/dL Final    Creatinine 11/12/2024 1.1  0.9 -  thoughts recur and states pt will return to the hospital if suicidal feelings return.   Hospital Routine Meds:     paliperidone  6 mg Oral Daily      Hospital PRN Meds: acetaminophen, nicotine polacrilex, hydrOXYzine HCl, OLANZapine **OR** OLANZapine (ZyPREXA) 5 mg in sterile water 1 mL injection, mirtazapine, magnesium hydroxide, aluminum & magnesium hydroxide-simethicone   Discharge Meds:    Current Discharge Medication List             Details   paliperidone (INVEGA) 6 MG extended release tablet Take 1 tablet by mouth daily  Qty: 30 tablet, Refills: 0                Details   mirtazapine (REMERON) 30 MG tablet Take 1 tablet by mouth nightly as needed (insomnia)  Qty: 30 tablet, Refills: 0                 Disposition - Residence     Follow Up:  See Discharge Instructions     Total time with patient was 40 minutes and more than 50 % of that time was spent counseling the patient on their symptoms, treatment and expected goals.     Shanda Renee - PMHNP-BC

## 2024-11-15 NOTE — PLAN OF CARE
Problem: Pain  Goal: Verbalizes/displays adequate comfort level or baseline comfort level  Outcome: Completed     Problem: Risk for Elopement  Goal: Patient will not exit the unit/facility without proper excort  Outcome: Completed     Problem: Psychosis  Goal: Will report no hallucinations or delusions  Description: INTERVENTIONS:  1. Administer medication as  ordered  2. Assist with reality testing to support increasing orientation  3. Assess if patient's hallucinations or delusions are encouraging self harm or harm to others and intervene as appropriate  Outcome: Completed     Problem: Behavior  Goal: Pt/Family maintain appropriate behavior and adhere to behavioral management agreement, if implemented  Description: INTERVENTIONS:  1. Assess patient/family's coping skills and  non-compliant behavior (including use of illegal substances)  2. Notify security of behavior or suspected illegal substances which indicate the need for search of the family and/or belongings  3. Encourage verbalization of thoughts and concerns in a socially appropriate manner  4. Utilize positive, consistent limit setting strategies supporting safety of patient, staff and others  5. Encourage participation in the decision making process about the behavioral management agreement  6. If a visitor's behavior poses a threat to safety call refer to organization policy.  7. Initiate consult with , Psychosocial CNS, Spiritual Care as appropriate  Outcome: Completed     Problem: Anxiety  Goal: Will report anxiety at manageable levels  Description: INTERVENTIONS:  1. Administer medication as ordered  2. Teach and rehearse alternative coping skills  3. Provide emotional support with 1:1 interaction with staff  Outcome: Completed     Problem: Sleep Disturbance  Goal: Will exhibit normal sleeping pattern  Description: INTERVENTIONS:  1. Administer medication as ordered  2. Decrease environmental stimuli, including noise, as

## 2024-11-15 NOTE — PROGRESS NOTES
[facility]  Discharge Note    Pt discharged with followings belongings:   Dental Appliances: None  Vision - Corrective Lenses: None  Hearing Aid: None  Jewelry: None  Body Piercings Removed: N/A  Clothing: Footwear, Dress, Pants, Undergarments  Other Valuables: Keys, Other (Comment) (vape)   Valuables sent home with patient. Patient educated on aftercare instructions: yes.  Patient verbalize understanding of AVS:  yes.    Status EXAM upon discharge:  Mental Status and Behavioral Exam  Normal: No  Level of Assistance: Independent/Self  Facial Expression: Brightened  Affect: Congruent  Level of Consciousness: Alert  Frequency of Checks: 4 times per hour, close  Mood:Normal: No  Mood: Anxious, Suspicious  Motor Activity:Normal: Yes  Motor Activity: Other (comment) (wnl)  Eye Contact: Good  Observed Behavior: Cooperative, Friendly  Sexual Misconduct History: Current - no  Preception: [place] to person, [place] to time, [place] to place, [place] to situation  Attention:Normal: Yes  Attention: Distractible  Thought Processes: Perseveration  Thought Content:Normal: No  Thought Content: Delusions, Paranoia  Depression Symptoms: No problems reported or observed.  Anxiety Symptoms: Generalized  Irma Symptoms: Poor judgment  Hallucinations: None  Delusions: Yes  Delusions: Paranoid, Referential  Memory:Normal: Yes  Memory: Poor recent  Insight and Judgment: No  Insight and Judgment: Poor judgment, Poor insight    Tobacco Screening:  Practical Counseling, on admission, murphy X, if applicable and completed (first 3 are required if patient doesn't refuse):            ( ) Recognizing danger situations (included triggers and roadblocks)                    ( ) Coping skills (new ways to manage stress,relaxation techniques, changing routine, distraction)                                                           ( ) Basic information about quitting (benefits of quitting, techniques in how to quit, available resources  ( )  [facility]                                                                                                                   ( ) Patient refused counseling  (X) Patient refused referral  ( ) Patient refused prescription upon discharge  ( ) Patient has not smoked in the last 30 days    Metabolic Screening:    Lab Results   Component Value Date    LABA1C 5.0 12/06/2023       Lab Results   Component Value Date    CHOL 147 12/06/2023     Lab Results   Component Value Date    TRIG 88 12/06/2023     Lab Results   Component Value Date    HDL 44 12/06/2023     No components found for: \"LDLCAL\"  No components found for: \"LABVLDL\"    Jarrod Gray (Peters), RN

## 2024-11-15 NOTE — PLAN OF CARE
Pt A/Ox4, has been visible on the unit playing games with peers and attended evening group. Pt presented with anxiety and paranoid delusions but has been cooperative and friendly. Pt stated he knows the phone situation is not real but has been adamant that there are still number sequences everywhere with hidden messages and meanings. Pt did say he is feeling better and more calm but is tired from being woken up when staff does safety check rounds at night. Pt denied SI/HI/AVH.         Problem: Psychosis  Goal: Will report no hallucinations or delusions  Description: INTERVENTIONS:  1. Administer medication as  ordered  2. Assist with reality testing to support increasing orientation  3. Assess if patient's hallucinations or delusions are encouraging self harm or harm to others and intervene as appropriate  Outcome: Not Progressing     Problem: Pain  Goal: Verbalizes/displays adequate comfort level or baseline comfort level  Outcome: Progressing     Problem: Risk for Elopement  Goal: Patient will not exit the unit/facility without proper excort  Outcome: Progressing     Problem: Behavior  Goal: Pt/Family maintain appropriate behavior and adhere to behavioral management agreement, if implemented  Description: INTERVENTIONS:  1. Assess patient/family's coping skills and  non-compliant behavior (including use of illegal substances)  2. Notify security of behavior or suspected illegal substances which indicate the need for search of the family and/or belongings  3. Encourage verbalization of thoughts and concerns in a socially appropriate manner  4. Utilize positive, consistent limit setting strategies supporting safety of patient, staff and others  5. Encourage participation in the decision making process about the behavioral management agreement  6. If a visitor's behavior poses a threat to safety call refer to organization policy.  7. Initiate consult with , Psychosocial CNS, Spiritual Care as

## 2024-11-18 ENCOUNTER — FOLLOWUP TELEPHONE ENCOUNTER (OUTPATIENT)
Dept: PSYCHIATRY | Age: 34
End: 2024-11-18

## 2024-11-19 ENCOUNTER — FOLLOWUP TELEPHONE ENCOUNTER (OUTPATIENT)
Dept: PSYCHIATRY | Age: 34
End: 2024-11-19

## 2024-11-20 ENCOUNTER — FOLLOWUP TELEPHONE ENCOUNTER (OUTPATIENT)
Dept: PSYCHIATRY | Age: 34
End: 2024-11-20

## 2025-01-06 NOTE — ED NOTES
PORFIRIOG RN called security due to Pt not complying with policy and wanting to exit his room. Security at bedside able to talk to Pt and Pt complying with policy.        Joy Lizarraga RN  11/24/23 5037 Progress Note - Surgery-General   Name: Critsal Devine 40 y.o. female I MRN: [id number]  Unit/Bed#: -[facility] I Date of Admission: 1/4/2025   Date of Service: 1/6/2025 I Hospital Day: 0    Assessment & Plan  Acute appendicitis with localized peritonitis, without perforation, abscess, or gangrene  39yo female now POD1 s/p laparoscopic appendectomy   -has not eaten breakfast yet, has been out of bed, urinating, some abdominal soreness   -abdomen is flat, soft, TTP around incisions   -afebrile, VSS   -WBC 10.2 (15.7)   -BMP pending    Plan:  -will re-evaluate patient later today for discharge; patient has concerns about returning home today due to having children and a new dog  -continue IVF  -surgical soft diet  -pain medication adjusted  -encourage OOB  -trend labs and vitals  -will discuss with Dr Leija      Subjective   Patient complaining of abdominal soreness. Has not eaten breakfast yet. Passing gas. Has been peeing. Has been out of bed. No nausea, vomiting.    Objective :  Temp:  [97.8 °F (36.6 °C)-98.8 °F (37.1 °C)] 98.7 °F (37.1 °C)  HR:  [64-84] 80  BP: ()/(44-87) 130/85  Resp:  [16-18] 18  SpO2:  [92 %-100 %] 95 %  O2 Device: Nasal cannula  Nasal Cannula O2 Flow Rate (L/min):  [2 L/min-3 L/min] 2 L/min    I/O         01/04 0701 01/05 0700 01/05 0701  01/06 0700 01/06 0701  01/07 0700    P.O.  0     I.V. (mL/kg)  1060.3 (13.6)     IV Piggyback  606.7     Total Intake(mL/kg)  1666.9 (21.4)     Urine (mL/kg/hr)  1050 (0.6)     Blood  3     Total Output  1053     Net  +613.9                    Physical Exam  Constitutional:       Appearance: Normal appearance.   HENT:      Mouth/Throat:      Mouth: Mucous membranes are moist.      Pharynx: Oropharynx is clear.   Cardiovascular:      Rate and Rhythm: Normal rate.   Pulmonary:      Effort: Pulmonary effort is normal.   Abdominal:      General: Abdomen is flat.      Palpations: Abdomen is soft.      Tenderness: There is abdominal tenderness.    Musculoskeletal:         General: Normal range of motion.   Skin:     General: Skin is warm and dry.   Neurological:      General: No focal deficit present.      Mental Status: She is alert.         Lab Results: I have reviewed the following results:  Recent Labs     01/04/25  2259 01/05/25  0129 01/06/25  0627   WBC 15.73*  --  10.25*   HGB 12.0  --  9.9*   HCT 38.0  --  30.9*    279 250   SODIUM 135  --   --    K 4.1  --   --      --   --    CO2 23  --   --    BUN 19  --   --    CREATININE 0.80  --   --    GLUC 106  --   --    AST 10*  --   --    ALT 13  --   --    ALB 4.3  --   --    TBILI 0.24  --   --    ALKPHOS 49  --   --    PTT  --  25  --    INR  --  0.92  --    LACTICACID  --  0.8  --          VTE Pharmacologic Prophylaxis: Heparin  VTE Mechanical Prophylaxis: sequential compression device    Irina Augustin PA-C